# Patient Record
Sex: FEMALE | Race: WHITE | NOT HISPANIC OR LATINO | Employment: FULL TIME | URBAN - METROPOLITAN AREA
[De-identification: names, ages, dates, MRNs, and addresses within clinical notes are randomized per-mention and may not be internally consistent; named-entity substitution may affect disease eponyms.]

---

## 2023-05-13 ASSESSMENT — PROMIS GLOBAL HEALTH SCALE
RATE_GENERAL_HEALTH: VERY GOOD
RATE_SOCIAL_SATISFACTION: VERY GOOD
EMOTIONAL_PROBLEMS: SOMETIMES
RATE_PHYSICAL_HEALTH: VERY GOOD
RATE_AVERAGE_FATIGUE: MILD
RATE_AVERAGE_PAIN: 3
RATE_MENTAL_HEALTH: VERY GOOD
RATE_QUALITY_OF_LIFE: VERY GOOD
CARRYOUT_SOCIAL_ACTIVITIES: EXCELLENT
CARRYOUT_PHYSICAL_ACTIVITIES: COMPLETELY

## 2023-05-15 ENCOUNTER — LAB (OUTPATIENT)
Dept: LAB | Facility: LAB | Age: 48
End: 2023-05-15
Payer: COMMERCIAL

## 2023-05-15 ENCOUNTER — OFFICE VISIT (OUTPATIENT)
Dept: PRIMARY CARE | Facility: CLINIC | Age: 48
End: 2023-05-15
Payer: COMMERCIAL

## 2023-05-15 VITALS
HEART RATE: 70 BPM | SYSTOLIC BLOOD PRESSURE: 124 MMHG | BODY MASS INDEX: 34.61 KG/M2 | WEIGHT: 221 LBS | DIASTOLIC BLOOD PRESSURE: 80 MMHG

## 2023-05-15 DIAGNOSIS — E55.9 VITAMIN D DEFICIENCY: ICD-10-CM

## 2023-05-15 DIAGNOSIS — Z11.59 NEED FOR HEPATITIS C SCREENING TEST: ICD-10-CM

## 2023-05-15 DIAGNOSIS — R53.83 OTHER FATIGUE: ICD-10-CM

## 2023-05-15 DIAGNOSIS — Z11.4 ENCOUNTER FOR SCREENING FOR HIV: ICD-10-CM

## 2023-05-15 DIAGNOSIS — F41.9 ANXIETY: ICD-10-CM

## 2023-05-15 DIAGNOSIS — Z00.00 ANNUAL PHYSICAL EXAM: ICD-10-CM

## 2023-05-15 DIAGNOSIS — M54.50 CHRONIC BILATERAL LOW BACK PAIN WITHOUT SCIATICA: ICD-10-CM

## 2023-05-15 DIAGNOSIS — Z12.31 ENCOUNTER FOR SCREENING MAMMOGRAM FOR BREAST CANCER: ICD-10-CM

## 2023-05-15 DIAGNOSIS — Z00.00 ANNUAL PHYSICAL EXAM: Primary | ICD-10-CM

## 2023-05-15 DIAGNOSIS — Z78.0 MENOPAUSE: ICD-10-CM

## 2023-05-15 DIAGNOSIS — G89.29 CHRONIC BILATERAL LOW BACK PAIN WITHOUT SCIATICA: ICD-10-CM

## 2023-05-15 DIAGNOSIS — J30.2 CHRONIC SEASONAL ALLERGIC RHINITIS: ICD-10-CM

## 2023-05-15 DIAGNOSIS — E89.0 HYPOTHYROIDISM, POSTABLATIVE: ICD-10-CM

## 2023-05-15 DIAGNOSIS — G43.909 MIGRAINE WITHOUT STATUS MIGRAINOSUS, NOT INTRACTABLE, UNSPECIFIED MIGRAINE TYPE: ICD-10-CM

## 2023-05-15 PROBLEM — R73.9 HYPERGLYCEMIA: Status: ACTIVE | Noted: 2023-05-15

## 2023-05-15 PROBLEM — E78.2 MIXED HYPERLIPIDEMIA: Status: ACTIVE | Noted: 2023-05-15

## 2023-05-15 PROBLEM — M17.0 DEGENERATIVE ARTHRITIS OF KNEE, BILATERAL: Status: ACTIVE | Noted: 2023-05-15

## 2023-05-15 PROBLEM — R23.2 HOT FLASHES: Status: ACTIVE | Noted: 2023-05-15

## 2023-05-15 PROBLEM — R92.8 ABNORMAL SCREENING MAMMOGRAM: Status: ACTIVE | Noted: 2023-05-15

## 2023-05-15 PROBLEM — Z86.39 HISTORY OF GRAVES' DISEASE: Status: ACTIVE | Noted: 2019-11-25

## 2023-05-15 PROBLEM — F51.05 INSOMNIA SECONDARY TO ANXIETY: Status: ACTIVE | Noted: 2023-05-15

## 2023-05-15 PROBLEM — F40.248 SITUATIONAL PHOBIA: Status: ACTIVE | Noted: 2023-05-15

## 2023-05-15 PROBLEM — L90.0 LICHEN SCLEROSUS: Status: ACTIVE | Noted: 2023-05-15

## 2023-05-15 PROBLEM — R42 VERTIGO, INTERMITTENT: Status: ACTIVE | Noted: 2023-05-15

## 2023-05-15 LAB
ALANINE AMINOTRANSFERASE (SGPT) (U/L) IN SER/PLAS: 13 U/L (ref 7–45)
ALBUMIN (G/DL) IN SER/PLAS: 4.8 G/DL (ref 3.4–5)
ALKALINE PHOSPHATASE (U/L) IN SER/PLAS: 67 U/L (ref 33–110)
ANION GAP IN SER/PLAS: 13 MMOL/L (ref 10–20)
ASPARTATE AMINOTRANSFERASE (SGOT) (U/L) IN SER/PLAS: 14 U/L (ref 9–39)
BASOPHILS (10*3/UL) IN BLOOD BY AUTOMATED COUNT: 0.04 X10E9/L (ref 0–0.1)
BASOPHILS/100 LEUKOCYTES IN BLOOD BY AUTOMATED COUNT: 0.6 % (ref 0–2)
BILIRUBIN TOTAL (MG/DL) IN SER/PLAS: 0.7 MG/DL (ref 0–1.2)
CALCIDIOL (25 OH VITAMIN D3) (NG/ML) IN SER/PLAS: 51 NG/ML
CALCIUM (MG/DL) IN SER/PLAS: 9.8 MG/DL (ref 8.6–10.6)
CARBON DIOXIDE, TOTAL (MMOL/L) IN SER/PLAS: 28 MMOL/L (ref 21–32)
CHLORIDE (MMOL/L) IN SER/PLAS: 105 MMOL/L (ref 98–107)
CHOLESTEROL (MG/DL) IN SER/PLAS: 252 MG/DL (ref 0–199)
CHOLESTEROL IN HDL (MG/DL) IN SER/PLAS: 45.7 MG/DL
CHOLESTEROL/HDL RATIO: 5.5
COBALAMIN (VITAMIN B12) (PG/ML) IN SER/PLAS: 415 PG/ML (ref 211–911)
CREATININE (MG/DL) IN SER/PLAS: 0.77 MG/DL (ref 0.5–1.05)
EOSINOPHILS (10*3/UL) IN BLOOD BY AUTOMATED COUNT: 0.05 X10E9/L (ref 0–0.7)
EOSINOPHILS/100 LEUKOCYTES IN BLOOD BY AUTOMATED COUNT: 0.8 % (ref 0–6)
ERYTHROCYTE DISTRIBUTION WIDTH (RATIO) BY AUTOMATED COUNT: 12.1 % (ref 11.5–14.5)
ERYTHROCYTE MEAN CORPUSCULAR HEMOGLOBIN CONCENTRATION (G/DL) BY AUTOMATED: 33.2 G/DL (ref 32–36)
ERYTHROCYTE MEAN CORPUSCULAR VOLUME (FL) BY AUTOMATED COUNT: 90 FL (ref 80–100)
ERYTHROCYTES (10*6/UL) IN BLOOD BY AUTOMATED COUNT: 5.17 X10E12/L (ref 4–5.2)
GFR FEMALE: >90 ML/MIN/1.73M2
GLUCOSE (MG/DL) IN SER/PLAS: 95 MG/DL (ref 74–99)
HEMATOCRIT (%) IN BLOOD BY AUTOMATED COUNT: 46.7 % (ref 36–46)
HEMOGLOBIN (G/DL) IN BLOOD: 15.5 G/DL (ref 12–16)
HEPATITIS C VIRUS AB PRESENCE IN SERUM: NONREACTIVE
HIV 1/ 2 AG/AB SCREEN: NONREACTIVE
IMMATURE GRANULOCYTES/100 LEUKOCYTES IN BLOOD BY AUTOMATED COUNT: 0.3 % (ref 0–0.9)
LDL: 188 MG/DL (ref 0–99)
LEUKOCYTES (10*3/UL) IN BLOOD BY AUTOMATED COUNT: 6.6 X10E9/L (ref 4.4–11.3)
LYMPHOCYTES (10*3/UL) IN BLOOD BY AUTOMATED COUNT: 2.02 X10E9/L (ref 1.2–4.8)
LYMPHOCYTES/100 LEUKOCYTES IN BLOOD BY AUTOMATED COUNT: 30.4 % (ref 13–44)
MONOCYTES (10*3/UL) IN BLOOD BY AUTOMATED COUNT: 0.31 X10E9/L (ref 0.1–1)
MONOCYTES/100 LEUKOCYTES IN BLOOD BY AUTOMATED COUNT: 4.7 % (ref 2–10)
NEUTROPHILS (10*3/UL) IN BLOOD BY AUTOMATED COUNT: 4.2 X10E9/L (ref 1.2–7.7)
NEUTROPHILS/100 LEUKOCYTES IN BLOOD BY AUTOMATED COUNT: 63.2 % (ref 40–80)
NRBC (PER 100 WBCS) BY AUTOMATED COUNT: 0 /100 WBC (ref 0–0)
PLATELETS (10*3/UL) IN BLOOD AUTOMATED COUNT: 360 X10E9/L (ref 150–450)
POC APPEARANCE, URINE: CLEAR
POC BILIRUBIN, URINE: NEGATIVE
POC BLOOD, URINE: NEGATIVE
POC COLOR, URINE: YELLOW
POC GLUCOSE, URINE: NEGATIVE MG/DL
POC KETONES, URINE: NEGATIVE MG/DL
POC LEUKOCYTES, URINE: NEGATIVE
POC NITRITE,URINE: NEGATIVE
POC PH, URINE: 6 PH
POC PROTEIN, URINE: NEGATIVE MG/DL
POC SPECIFIC GRAVITY, URINE: 1.01
POC UROBILINOGEN, URINE: 0.2 EU/DL
POTASSIUM (MMOL/L) IN SER/PLAS: 4.6 MMOL/L (ref 3.5–5.3)
PROTEIN TOTAL: 6.8 G/DL (ref 6.4–8.2)
SODIUM (MMOL/L) IN SER/PLAS: 141 MMOL/L (ref 136–145)
THYROTROPIN (MIU/L) IN SER/PLAS BY DETECTION LIMIT <= 0.05 MIU/L: 2.44 MIU/L (ref 0.44–3.98)
TRIGLYCERIDE (MG/DL) IN SER/PLAS: 90 MG/DL (ref 0–149)
UREA NITROGEN (MG/DL) IN SER/PLAS: 15 MG/DL (ref 6–23)
VLDL: 18 MG/DL (ref 0–40)

## 2023-05-15 PROCEDURE — 82607 VITAMIN B-12: CPT

## 2023-05-15 PROCEDURE — 87389 HIV-1 AG W/HIV-1&-2 AB AG IA: CPT

## 2023-05-15 PROCEDURE — 82306 VITAMIN D 25 HYDROXY: CPT

## 2023-05-15 PROCEDURE — 86803 HEPATITIS C AB TEST: CPT

## 2023-05-15 PROCEDURE — 85025 COMPLETE CBC W/AUTO DIFF WBC: CPT

## 2023-05-15 PROCEDURE — 1036F TOBACCO NON-USER: CPT | Performed by: FAMILY MEDICINE

## 2023-05-15 PROCEDURE — 99214 OFFICE O/P EST MOD 30 MIN: CPT | Performed by: FAMILY MEDICINE

## 2023-05-15 PROCEDURE — 93000 ELECTROCARDIOGRAM COMPLETE: CPT | Performed by: FAMILY MEDICINE

## 2023-05-15 PROCEDURE — 84443 ASSAY THYROID STIM HORMONE: CPT

## 2023-05-15 PROCEDURE — 80053 COMPREHEN METABOLIC PANEL: CPT

## 2023-05-15 PROCEDURE — 81002 URINALYSIS NONAUTO W/O SCOPE: CPT | Performed by: FAMILY MEDICINE

## 2023-05-15 PROCEDURE — 99396 PREV VISIT EST AGE 40-64: CPT | Performed by: FAMILY MEDICINE

## 2023-05-15 PROCEDURE — 80061 LIPID PANEL: CPT

## 2023-05-15 PROCEDURE — 36415 COLL VENOUS BLD VENIPUNCTURE: CPT

## 2023-05-15 RX ORDER — LEVOCETIRIZINE DIHYDROCHLORIDE 5 MG/1
5 TABLET, FILM COATED ORAL EVERY EVENING
Qty: 90 TABLET | Refills: 3 | Status: SHIPPED | OUTPATIENT
Start: 2023-05-15 | End: 2023-08-28 | Stop reason: SDUPTHER

## 2023-05-15 RX ORDER — ONDANSETRON 4 MG/1
TABLET, FILM COATED ORAL
Qty: 20 TABLET | Refills: 3 | Status: SHIPPED | OUTPATIENT
Start: 2023-05-15 | End: 2024-05-23 | Stop reason: SDUPTHER

## 2023-05-15 RX ORDER — LEVOTHYROXINE SODIUM 125 UG/1
TABLET ORAL
COMMUNITY
End: 2023-05-19 | Stop reason: SDUPTHER

## 2023-05-15 RX ORDER — ONDANSETRON 4 MG/1
TABLET, FILM COATED ORAL
COMMUNITY
End: 2023-05-15 | Stop reason: SDUPTHER

## 2023-05-15 RX ORDER — METHOCARBAMOL 750 MG/1
TABLET, FILM COATED ORAL EVERY 8 HOURS PRN
COMMUNITY
Start: 2022-05-15 | End: 2023-05-15

## 2023-05-15 RX ORDER — EPINEPHRINE 0.3 MG/.3ML
0.3 INJECTION SUBCUTANEOUS
COMMUNITY
Start: 2019-08-02 | End: 2024-05-23 | Stop reason: ALTCHOICE

## 2023-05-15 RX ORDER — HYDROXYZINE HYDROCHLORIDE 50 MG/1
TABLET, FILM COATED ORAL
COMMUNITY
End: 2023-05-15 | Stop reason: SDUPTHER

## 2023-05-15 RX ORDER — RIZATRIPTAN BENZOATE 10 MG/1
TABLET, ORALLY DISINTEGRATING ORAL
COMMUNITY
Start: 2022-06-07 | End: 2023-05-15 | Stop reason: SDUPTHER

## 2023-05-15 RX ORDER — HYDROXYZINE HYDROCHLORIDE 50 MG/1
TABLET, FILM COATED ORAL
Qty: 30 TABLET | Refills: 1 | Status: SHIPPED | OUTPATIENT
Start: 2023-05-15 | End: 2024-05-23 | Stop reason: SDUPTHER

## 2023-05-15 RX ORDER — LEVOCETIRIZINE DIHYDROCHLORIDE 5 MG/1
5 TABLET, FILM COATED ORAL DAILY
COMMUNITY
End: 2023-05-15 | Stop reason: SDUPTHER

## 2023-05-15 RX ORDER — RIZATRIPTAN BENZOATE 10 MG/1
10 TABLET, ORALLY DISINTEGRATING ORAL ONCE AS NEEDED
Qty: 9 TABLET | Refills: 5 | Status: SHIPPED | OUTPATIENT
Start: 2023-05-15 | End: 2024-05-23 | Stop reason: SDUPTHER

## 2023-05-15 RX ORDER — CYCLOBENZAPRINE HCL 5 MG
5 TABLET ORAL 3 TIMES DAILY PRN
COMMUNITY
End: 2023-05-15 | Stop reason: SDUPTHER

## 2023-05-15 RX ORDER — CLOBETASOL PROPIONATE 0.5 MG/G
OINTMENT TOPICAL
COMMUNITY
Start: 2022-05-24 | End: 2023-05-15 | Stop reason: ALTCHOICE

## 2023-05-15 RX ORDER — CYCLOBENZAPRINE HCL 5 MG
5 TABLET ORAL 3 TIMES DAILY PRN
Qty: 30 TABLET | Refills: 0 | Status: SHIPPED | OUTPATIENT
Start: 2023-05-15 | End: 2024-05-23 | Stop reason: SDUPTHER

## 2023-05-15 RX ORDER — DESVENLAFAXINE SUCCINATE 50 MG/1
1 TABLET, EXTENDED RELEASE ORAL DAILY
COMMUNITY
Start: 2022-06-07 | End: 2023-05-15 | Stop reason: SDDI

## 2023-05-15 ASSESSMENT — PATIENT HEALTH QUESTIONNAIRE - PHQ9
2. FEELING DOWN, DEPRESSED OR HOPELESS: NOT AT ALL
1. LITTLE INTEREST OR PLEASURE IN DOING THINGS: NOT AT ALL
SUM OF ALL RESPONSES TO PHQ9 QUESTIONS 1 AND 2: 0

## 2023-05-15 NOTE — ASSESSMENT & PLAN NOTE
We talked about the fact that you are likely in menopause or at least perimenopausal  I do not think it is worth your money checking hormone levels because you are not interested in pursuing hormone replacement therapy, and quite frankly if we did it today and we did it again in 2 weeks those levels would probably be much different  Continue supplementation, as long as you are not creating havoc with your liver functioning because you seem to feel very good while using them

## 2023-05-15 NOTE — ASSESSMENT & PLAN NOTE
Complete history and physical examination was performed  EKG reveals sinus rhythm without acute changes  I have provided you with requisitions for routine screening blood work.  Please have these performed at your earliest convenience.  We will notify you of test results once available and make treatment recommendations accordingly.

## 2023-05-15 NOTE — ASSESSMENT & PLAN NOTE
We will check TSH with your blood work today  We will wait to refill your Synthroid until we have the results of your blood work

## 2023-05-15 NOTE — PROGRESS NOTES
Subjective   Patient ID: Melissa Jorge is a 47 y.o. female who presents for Annual Exam.    HPI  1.  Health maintenance  Yet to have colonoscopy, says she has it scheduled in June  Gets Pap and mammogram through GYN  Not up-to-date on Tdap, says she is very hesitant to get any more vaccines since she had a severe case of vertigo from second COVID-vaccine    2.  Hypothyroidism  Currently taking Synthroid 125 mcg daily  Feels euthyroid    3.  Perimenopausal  Continues to be perimenopausal, having approximately 4-5 periods per year since 2020.  Symptoms have been improved with over-the-counter supplements  She would like vitamin D, B12, and iron studies performed    4.  Migraines  Taking Maxalt and Zofran as needed for migraines with good relief  Her migraines cause her to be very nauseous    5.  Anxiety  Using Atarax as needed  Says she is using it very infrequently and overall feels well    6.  Chronic bilateral low back pain  Requesting refill of Flexeril to be used as needed    Review of Systems  All pertinent positive symptoms are included in the history of present illness.    All other systems have been reviewed and are negative and noncontributory to this patient's current ailments.     Allergies   Allergen Reactions    Vancomycin Angioedema    Levofloxacin Swelling    Morphine Itching    Erythromycin Rash        Immunization History   Administered Date(s) Administered    Influenza, Unspecified 12/09/2009    Pfizer Purple Cap SARS-CoV-2 03/26/2021, 04/16/2021       Objective   Vitals:    05/15/23 0925   BP: 124/80   Pulse: 70   Weight: 100 kg (221 lb)       Physical Exam  CONSTITUTIONAL - well nourished, well developed, looks like stated age, in no acute distress, not ill-appearing, and not tired appearing  SKIN - normal skin color and pigmentation, normal skin turgor without rash, lesions, or nodules visualized  HEAD - no trauma, normocephalic  EYES - pupils are equal and reactive to light, extraocular  muscles are intact, and normal external exam  ENT - TM's intact, no injection, no signs of infection, uvula midline, normal tongue movement and throat normal, no exudate, nasal passage without discharge and patent  NECK - supple without rigidity, no neck mass was observed, no thyromegaly or thyroid nodules  CHEST - clear to auscultation, no wheezing, no crackles and no rales, good effort  CARDIAC - regular rate and regular rhythm, no skipped beats, no murmur  ABDOMEN - no organomegaly, soft, nontender, nondistended, normal bowel sounds, no guarding/rebound/rigidity, negative McBurney sign and negative Snider sign  EXTREMITIES - no edema, no deformities  NEUROLOGICAL - normal gait, normal balance, normal motor, no ataxia; alert, oriented and no focal signs  PSYCHIATRIC - alert, pleasant and cordial, age-appropriate  IMMUNOLOGIC - no cervical lymphadenopathy     Assessment/Plan   Problem List Items Addressed This Visit       Anxiety     Continue Atarax as needed         Relevant Medications    hydrOXYzine HCL (Atarax) 50 mg tablet    Chronic seasonal allergic rhinitis     Continue Xyzal daily         Relevant Medications    levocetirizine (Xyzal) 5 mg tablet    Hypothyroidism, postablative     We will check TSH with your blood work today  We will wait to refill your Synthroid until we have the results of your blood work         Migraine headache     Continue Maxalt and Zofran as needed for migraines         Relevant Medications    rizatriptan MLT (Maxalt-MLT) 10 mg disintegrating tablet    ondansetron (Zofran) 4 mg tablet    Vitamin D deficiency    Relevant Orders    Vitamin D 25-Hydroxy,Total (for eval of Vitamin D levels)    Annual physical exam - Primary     Complete history and physical examination was performed  EKG reveals sinus rhythm without acute changes  I have provided you with requisitions for routine screening blood work.  Please have these performed at your earliest convenience.  We will notify you of  test results once available and make treatment recommendations accordingly.         Relevant Orders    Lipid Panel    TSH with reflex to Free T4 if abnormal    Comprehensive Metabolic Panel    CBC and Auto Differential    HIV 1/2 Antigen/Antibody Screen with Reflex to Confirmation    Hepatitis C Antibody    POCT UA (nonautomated) manually resulted (Completed)    ECG 12 lead (Clinic Performed) (Completed)    Menopause     We talked about the fact that you are likely in menopause or at least perimenopausal  I do not think it is worth your money checking hormone levels because you are not interested in pursuing hormone replacement therapy, and quite frankly if we did it today and we did it again in 2 weeks those levels would probably be much different  Continue supplementation, as long as you are not creating havoc with your liver functioning because you seem to feel very good while using them         Relevant Orders    Vitamin B12    Chronic low back pain     Continue Flexeril as needed         Relevant Medications    cyclobenzaprine (Flexeril) 5 mg tablet     Other Visit Diagnoses       Encounter for screening for HIV        Relevant Orders    HIV 1/2 Antigen/Antibody Screen with Reflex to Confirmation    Need for hepatitis C screening test        Relevant Orders    Hepatitis C Antibody    Other fatigue        Relevant Orders    Vitamin B12    Encounter for screening mammogram for breast cancer        Relevant Orders    BI mammo bilateral screening tomosynthesis

## 2023-05-17 NOTE — RESULT ENCOUNTER NOTE
Cholesterol 252 with  previous 211-1 47    Complete blood cell count relatively normal with hematocrit 0.7 above normal, not too worried about that currently; we will continue to monitor    HIV, hepatitis C both nonreactive    Vitamin D excellent at 51, vitamin B12 excellent at 415 and thyroid is normal as well    Sugar, kidney, liver absolutely fantastic    For the most part your blood work looks good, cholesterol is a bit high with that bad cholesterol at 188, your current heart attack risk over the next 10 years is 2% so generally speaking if it is less than 5% we do not use cholesterol medication, or at least recommended, but once her bad cholesterol gets above 179 and we do recommended so you may want to consider taking a statin therapy, low-dose, in addition to drastically changing lifestyle with a goal of weight loss

## 2023-05-18 DIAGNOSIS — E03.9 HYPOTHYROIDISM, UNSPECIFIED TYPE: ICD-10-CM

## 2023-05-19 DIAGNOSIS — E89.0 HYPOTHYROIDISM, POSTABLATIVE: Primary | ICD-10-CM

## 2023-05-19 RX ORDER — LEVOTHYROXINE SODIUM 125 UG/1
TABLET ORAL
Qty: 90 TABLET | Refills: 1 | Status: SHIPPED | OUTPATIENT
Start: 2023-05-19 | End: 2024-02-12 | Stop reason: SDUPTHER

## 2023-05-19 RX ORDER — LEVOTHYROXINE SODIUM 125 UG/1
TABLET ORAL
Qty: 90 TABLET | Refills: 1 | OUTPATIENT
Start: 2023-05-19

## 2023-08-28 DIAGNOSIS — J30.2 CHRONIC SEASONAL ALLERGIC RHINITIS: ICD-10-CM

## 2023-08-28 RX ORDER — LEVOCETIRIZINE DIHYDROCHLORIDE 5 MG/1
5 TABLET, FILM COATED ORAL EVERY EVENING
Qty: 90 TABLET | Refills: 3 | Status: SHIPPED
Start: 2023-08-28 | End: 2024-05-23 | Stop reason: SDUPTHER

## 2023-12-19 ENCOUNTER — TELEMEDICINE (OUTPATIENT)
Dept: PRIMARY CARE | Facility: CLINIC | Age: 48
End: 2023-12-19
Payer: COMMERCIAL

## 2023-12-19 DIAGNOSIS — T36.95XA ANTIBIOTIC-INDUCED YEAST INFECTION: ICD-10-CM

## 2023-12-19 DIAGNOSIS — J01.40 ACUTE NON-RECURRENT PANSINUSITIS: Primary | ICD-10-CM

## 2023-12-19 DIAGNOSIS — B37.9 ANTIBIOTIC-INDUCED YEAST INFECTION: ICD-10-CM

## 2023-12-19 PROCEDURE — 99214 OFFICE O/P EST MOD 30 MIN: CPT | Performed by: FAMILY MEDICINE

## 2023-12-19 RX ORDER — FLUCONAZOLE 150 MG/1
150 TABLET ORAL ONCE
Qty: 1 TABLET | Refills: 0 | Status: SHIPPED | OUTPATIENT
Start: 2023-12-19 | End: 2023-12-19

## 2023-12-19 RX ORDER — DOXYCYCLINE 100 MG/1
100 CAPSULE ORAL 2 TIMES DAILY
Qty: 14 CAPSULE | Refills: 0 | Status: SHIPPED | OUTPATIENT
Start: 2023-12-19 | End: 2023-12-20 | Stop reason: SINTOL

## 2023-12-19 NOTE — PROGRESS NOTES
Subjective   Patient ID: Melissa Jorge is a 48 y.o. female who presents for No chief complaint on file..    Past Medical, Surgical, and Family History reviewed and updated in chart.    Reviewed all medications by prescribing practitioner or clinical pharmacist (such as prescriptions, OTCs, herbal therapies and supplements) and documented in the medical record.    HPI  Melissa's  fell ill approximately two weeks ago, around the same time Melissa began experiencing symptoms of a cold. Despite these symptoms persisting, Melissa has tested negative for COVID-19 twice, most recently just yesterday. She has reported experiencing dental pain, facial pressure and pain, as well as a noticeable discoloration under her eyes. Her symptoms also include a non-productive cough and extreme fatigue. Melissa's daughter, who is a nurse, examined her and found her lungs to be clear. Melissa has been dealing with a significant amount of postnasal drip and ear pressure.    She does not have any recent travel history, but her current work situation involves frequent meetings and handshaking, which could have increased her exposure to potential pathogens. Yesterday, she slept for an unusually long duration of 12 hours and even had to call in sick for work, which is out of character for her. Melissa's lymph nodes are swollen. Melissa has also recently noticed a thick, green nasal discharge.    Review of Systems  All pertinent positive symptoms are included in the history of present illness.    All other systems have been reviewed and are negative and noncontributory to this patient's current ailments.    Past Medical History:   Diagnosis Date    Allergic     Angioneurotic edema, initial encounter 08/02/2019    Angioedema of lips    Anxiety     Migraine, unspecified, not intractable, without status migrainosus 04/07/2022    Migraine headache    Personal history of other endocrine, nutritional and metabolic disease     History of thyroid disorder     Postprocedural hypothyroidism 2022    Hypothyroidism, postablative     Past Surgical History:   Procedure Laterality Date     SECTION, CLASSIC  2015     Section     SECTION, LOW TRANSVERSE      ENDOMETRIAL ABLATION      OOPHORECTOMY  2015    Oophorectomy - Unilateral (Removal Of One Ovary)    OTHER SURGICAL HISTORY  2015    Uterine Surgery    OTHER SURGICAL HISTORY  2020    Endometrial ablation    WISDOM TOOTH EXTRACTION       Social History     Tobacco Use    Smoking status: Never     Passive exposure: Never    Smokeless tobacco: Never   Substance Use Topics    Alcohol use: Not Currently     Comment: None    Drug use: Never     Family History   Problem Relation Name Age of Onset    Arthritis Mother Mother     Depression Mother Mother     Drug abuse Mother Mother     Early natural death Mother Mother     Hearing loss Mother Mother     Miscarriages / Stillbirths Mother Mother     Alcohol abuse Father Father     Heart disease Father Father     Cancer Maternal Grandmother Grandmother     Alcohol abuse Paternal Grandfather Grandfather      Immunization History   Administered Date(s) Administered    Influenza, Unspecified 2009    Pfizer Purple Cap SARS-CoV-2 2021, 2021     Current Outpatient Medications   Medication Instructions    cyclobenzaprine (FLEXERIL) 5 mg, oral, 3 times daily PRN    doxycycline (VIBRAMYCIN) 100 mg, oral, 2 times daily, Take with at least 8 ounces (large glass) of water, do not lie down for 30 minutes after    EPINEPHrine (EPIPEN) 0.3 mg, Daily RT    fluconazole (DIFLUCAN) 150 mg, oral, Once    hydrOXYzine HCL (Atarax) 50 mg tablet TAKE 1 TABLET BY MOUTH TWICE DAILY AS NEEDED FOR PANIC ATTACK    levocetirizine (XYZAL) 5 mg, oral, Every evening    levothyroxine (Synthroid) 125 mcg tablet TAKE 1 TABLET FIVE DAYS AND 1/2 TABLET TWO DAYS PER WEEK    ondansetron (Zofran) 4 mg tablet TAKE 1-2 TABLETS EVERY 8 HOURS AS NEEDED FOR  NAUSEA    rizatriptan MLT (MAXALT-MLT) 10 mg, oral, Once as needed     Allergies   Allergen Reactions    Vancomycin Angioedema    Levofloxacin Swelling    Morphine Itching    Erythromycin Rash       Objective   There were no vitals filed for this visit.  There is no height or weight on file to calculate BMI.    BP Readings from Last 3 Encounters:   06/29/23 130/82   05/15/23 124/80   06/07/22 124/80      Wt Readings from Last 3 Encounters:   06/29/23 101 kg (223 lb)   05/15/23 100 kg (221 lb)   06/07/22 95.3 kg (210 lb)        No visits with results within 1 Month(s) from this visit.   Latest known visit with results is:   Lab on 05/15/2023   Component Date Value    Cholesterol 05/15/2023 252 (H)     HDL 05/15/2023 45.7     Cholesterol/HDL Ratio 05/15/2023 5.5 (A)     LDL 05/15/2023 188 (H)     VLDL 05/15/2023 18     Triglycerides 05/15/2023 90     TSH 05/15/2023 2.44     Glucose 05/15/2023 95     Sodium 05/15/2023 141     Potassium 05/15/2023 4.6     Chloride 05/15/2023 105     Bicarbonate 05/15/2023 28     Anion Gap 05/15/2023 13     Urea Nitrogen 05/15/2023 15     Creatinine 05/15/2023 0.77     GFR Female 05/15/2023 >90     Calcium 05/15/2023 9.8     Albumin 05/15/2023 4.8     Alkaline Phosphatase 05/15/2023 67     Total Protein 05/15/2023 6.8     AST 05/15/2023 14     Total Bilirubin 05/15/2023 0.7     ALT (SGPT) 05/15/2023 13     WBC 05/15/2023 6.6     nRBC 05/15/2023 0.0     RBC 05/15/2023 5.17     Hemoglobin 05/15/2023 15.5     Hematocrit 05/15/2023 46.7 (H)     MCV 05/15/2023 90     MCHC 05/15/2023 33.2     Platelets 05/15/2023 360     RDW 05/15/2023 12.1     Neutrophils % 05/15/2023 63.2     Immature Granulocytes %,* 05/15/2023 0.3     Lymphocytes % 05/15/2023 30.4     Monocytes % 05/15/2023 4.7     Eosinophils % 05/15/2023 0.8     Basophils % 05/15/2023 0.6     Neutrophils Absolute 05/15/2023 4.20     Lymphocytes Absolute 05/15/2023 2.02     Monocytes Absolute 05/15/2023 0.31     Eosinophils Absolute  05/15/2023 0.05     Basophils Absolute 05/15/2023 0.04     HIV 1 and 2 Screen 05/15/2023 NONREACTIVE     Hepatitis C Ab 05/15/2023 NONREACTIVE     Vitamin D, 25-Hydroxy 05/15/2023 51     Vitamin B-12 05/15/2023 415      Physical Exam  CONSTITUTIONAL - well nourished, well developed, looks like stated age, in no acute distress, not ill-appearing, but tired appearing, nasal speech  SKIN - normal skin color and pigmentation  EYES - normal external exam  LUNGS - breathing comfortably, no dyspnea  EXTREMITIES - no deformities noticeable  NEUROLOGICAL - oriented and no focal signs  PSYCHIATRIC - alert, pleasant and cordial, age-appropriate, not anxious or depressed appearing    Assessment/Plan   Problem List Items Addressed This Visit       Acute non-recurrent pansinusitis - Primary     Risks, benefits, and options of treatment(s) were discussed after reviewing all current medication(s) and drug allergy(ies)  I opted for the treatment that we discussed with instructions on the medication use for your underlying medical ailment(s)  I encouraged supportive care such as rest, fluids and Advil/Tylenol as warranted  Return to the clinic in 7-10 days or sooner if symptoms worsen or persist as we will then further evaluate         Relevant Medications    doxycycline (Vibramycin) 100 mg capsule    Antibiotic-induced yeast infection     I understand that you've previously experienced vaginal yeast infections due to antibiotic therapy. To help prevent this, you've requested a Diflucan tablet. I've sent a prescription for Diflucan to your pharmacy. You can use this as needed to manage any potential yeast infections.         Relevant Medications    fluconazole (Diflucan) 150 mg tablet

## 2023-12-19 NOTE — ASSESSMENT & PLAN NOTE
I understand that you've previously experienced vaginal yeast infections due to antibiotic therapy. To help prevent this, you've requested a Diflucan tablet. I've sent a prescription for Diflucan to your pharmacy. You can use this as needed to manage any potential yeast infections.

## 2023-12-20 DIAGNOSIS — J01.40 ACUTE NON-RECURRENT PANSINUSITIS: Primary | ICD-10-CM

## 2023-12-20 RX ORDER — AMOXICILLIN 875 MG/1
875 TABLET, FILM COATED ORAL 2 TIMES DAILY
Qty: 14 TABLET | Refills: 0 | Status: SHIPPED | OUTPATIENT
Start: 2023-12-20 | End: 2023-12-27

## 2024-02-12 DIAGNOSIS — E89.0 HYPOTHYROIDISM, POSTABLATIVE: ICD-10-CM

## 2024-02-12 RX ORDER — LEVOTHYROXINE SODIUM 125 UG/1
TABLET ORAL
Qty: 90 TABLET | Refills: 0 | Status: SHIPPED
Start: 2024-02-12 | End: 2024-05-27 | Stop reason: SDUPTHER

## 2024-02-13 ENCOUNTER — TELEPHONE (OUTPATIENT)
Dept: OBSTETRICS AND GYNECOLOGY | Facility: CLINIC | Age: 49
End: 2024-02-13
Payer: COMMERCIAL

## 2024-02-13 ENCOUNTER — LAB (OUTPATIENT)
Dept: LAB | Facility: LAB | Age: 49
End: 2024-02-13
Payer: COMMERCIAL

## 2024-02-13 DIAGNOSIS — R30.0 DYSURIA: ICD-10-CM

## 2024-02-13 DIAGNOSIS — N30.00 ACUTE CYSTITIS WITHOUT HEMATURIA: Primary | ICD-10-CM

## 2024-02-13 PROCEDURE — 87086 URINE CULTURE/COLONY COUNT: CPT

## 2024-02-13 RX ORDER — NITROFURANTOIN 25; 75 MG/1; MG/1
100 CAPSULE ORAL 2 TIMES DAILY
Qty: 14 CAPSULE | Refills: 0 | Status: SHIPPED | OUTPATIENT
Start: 2024-02-13 | End: 2024-02-20

## 2024-02-13 NOTE — TELEPHONE ENCOUNTER
Patient called stating she's had an UTI started on Sunday. There is redness, itching and very uncomfortable. She would like to know if this is something she needs to be seen for or can you call in a prescription?    Patient is in meeting all day today and tmrw it will unlikely she will be answering her phone. She if you were to call her leave a detailed message she will call back when she's out of her meeting. To contact her [phone].    Pharmacy: [facility]

## 2024-02-13 NOTE — TELEPHONE ENCOUNTER
Spoke with patient, started having painful urination, frequency, and urgency on Sunday that improved some with increased water intake.  Today the symptoms have returned.  She is willing to go to outpatient lab after her meetings today to provide urine specimen.  Can something be prescribed for her to begin after?  She has done well with macrobid in the past and would like to use [facility] in [place].   Order for urine culture placed.

## 2024-02-14 LAB — BACTERIA UR CULT: NORMAL

## 2024-02-19 ENCOUNTER — APPOINTMENT (OUTPATIENT)
Dept: PRIMARY CARE | Facility: CLINIC | Age: 49
End: 2024-02-19
Payer: COMMERCIAL

## 2024-05-09 ENCOUNTER — APPOINTMENT (OUTPATIENT)
Dept: PRIMARY CARE | Facility: CLINIC | Age: 49
End: 2024-05-09
Payer: COMMERCIAL

## 2024-05-21 ENCOUNTER — APPOINTMENT (OUTPATIENT)
Dept: PRIMARY CARE | Facility: CLINIC | Age: 49
End: 2024-05-21
Payer: COMMERCIAL

## 2024-05-21 ASSESSMENT — PROMIS GLOBAL HEALTH SCALE
RATE_AVERAGE_PAIN: 2
EMOTIONAL_PROBLEMS: OFTEN
RATE_MENTAL_HEALTH: VERY GOOD
RATE_GENERAL_HEALTH: GOOD
RATE_QUALITY_OF_LIFE: VERY GOOD
RATE_PHYSICAL_HEALTH: GOOD
CARRYOUT_SOCIAL_ACTIVITIES: EXCELLENT
CARRYOUT_PHYSICAL_ACTIVITIES: COMPLETELY
RATE_AVERAGE_FATIGUE: MILD
RATE_SOCIAL_SATISFACTION: VERY GOOD

## 2024-05-23 ENCOUNTER — LAB (OUTPATIENT)
Dept: LAB | Facility: LAB | Age: 49
End: 2024-05-23
Payer: COMMERCIAL

## 2024-05-23 ENCOUNTER — OFFICE VISIT (OUTPATIENT)
Dept: PRIMARY CARE | Facility: CLINIC | Age: 49
End: 2024-05-23
Payer: COMMERCIAL

## 2024-05-23 VITALS
HEIGHT: 66 IN | SYSTOLIC BLOOD PRESSURE: 132 MMHG | DIASTOLIC BLOOD PRESSURE: 90 MMHG | WEIGHT: 226 LBS | HEART RATE: 81 BPM | BODY MASS INDEX: 36.32 KG/M2

## 2024-05-23 DIAGNOSIS — R63.8 UNABLE TO LOSE WEIGHT: ICD-10-CM

## 2024-05-23 DIAGNOSIS — Z12.31 ENCOUNTER FOR SCREENING MAMMOGRAM FOR BREAST CANCER: ICD-10-CM

## 2024-05-23 DIAGNOSIS — Z00.00 ANNUAL PHYSICAL EXAM: Primary | ICD-10-CM

## 2024-05-23 DIAGNOSIS — N95.1 PERIMENOPAUSE: ICD-10-CM

## 2024-05-23 DIAGNOSIS — G43.909 MIGRAINE WITHOUT STATUS MIGRAINOSUS, NOT INTRACTABLE, UNSPECIFIED MIGRAINE TYPE: ICD-10-CM

## 2024-05-23 DIAGNOSIS — Z00.00 ANNUAL PHYSICAL EXAM: ICD-10-CM

## 2024-05-23 DIAGNOSIS — J30.2 CHRONIC SEASONAL ALLERGIC RHINITIS: ICD-10-CM

## 2024-05-23 DIAGNOSIS — E89.0 HYPOTHYROIDISM, POSTABLATIVE: ICD-10-CM

## 2024-05-23 DIAGNOSIS — E78.2 MIXED HYPERLIPIDEMIA: ICD-10-CM

## 2024-05-23 DIAGNOSIS — R94.31 ABNORMAL EKG: ICD-10-CM

## 2024-05-23 DIAGNOSIS — M54.50 CHRONIC BILATERAL LOW BACK PAIN WITHOUT SCIATICA: ICD-10-CM

## 2024-05-23 DIAGNOSIS — G89.29 CHRONIC BILATERAL LOW BACK PAIN WITHOUT SCIATICA: ICD-10-CM

## 2024-05-23 DIAGNOSIS — F41.9 ANXIETY: ICD-10-CM

## 2024-05-23 PROBLEM — J01.40 ACUTE NON-RECURRENT PANSINUSITIS: Status: RESOLVED | Noted: 2023-12-19 | Resolved: 2024-05-23

## 2024-05-23 PROBLEM — F40.248 SITUATIONAL PHOBIA: Status: RESOLVED | Noted: 2023-05-15 | Resolved: 2024-05-23

## 2024-05-23 PROBLEM — B37.9 ANTIBIOTIC-INDUCED YEAST INFECTION: Status: RESOLVED | Noted: 2023-12-19 | Resolved: 2024-05-23

## 2024-05-23 PROBLEM — T36.95XA ANTIBIOTIC-INDUCED YEAST INFECTION: Status: RESOLVED | Noted: 2023-12-19 | Resolved: 2024-05-23

## 2024-05-23 LAB
25(OH)D3 SERPL-MCNC: 37 NG/ML (ref 30–100)
ALBUMIN SERPL BCP-MCNC: 4.5 G/DL (ref 3.4–5)
ALP SERPL-CCNC: 60 U/L (ref 33–110)
ALT SERPL W P-5'-P-CCNC: 14 U/L (ref 7–45)
ANION GAP SERPL CALC-SCNC: 8 MMOL/L (ref 10–20)
AST SERPL W P-5'-P-CCNC: 15 U/L (ref 9–39)
BASOPHILS # BLD AUTO: 0.05 X10*3/UL (ref 0–0.1)
BASOPHILS NFR BLD AUTO: 0.7 %
BILIRUB SERPL-MCNC: 0.6 MG/DL (ref 0–1.2)
BUN SERPL-MCNC: 11 MG/DL (ref 6–23)
CALCIUM SERPL-MCNC: 9.2 MG/DL (ref 8.6–10.3)
CHLORIDE SERPL-SCNC: 105 MMOL/L (ref 98–107)
CHOLEST SERPL-MCNC: 235 MG/DL (ref 0–199)
CHOLESTEROL/HDL RATIO: 5.1
CO2 SERPL-SCNC: 29 MMOL/L (ref 21–32)
CREAT SERPL-MCNC: 0.69 MG/DL (ref 0.5–1.05)
EGFRCR SERPLBLD CKD-EPI 2021: >90 ML/MIN/1.73M*2
EOSINOPHIL # BLD AUTO: 0.08 X10*3/UL (ref 0–0.7)
EOSINOPHIL NFR BLD AUTO: 1.1 %
ERYTHROCYTE [DISTWIDTH] IN BLOOD BY AUTOMATED COUNT: 11.8 % (ref 11.5–14.5)
ESTRADIOL SERPL-MCNC: 24 PG/ML
FSH SERPL-ACNC: 104 IU/L
GLUCOSE SERPL-MCNC: 91 MG/DL (ref 74–99)
HCT VFR BLD AUTO: 45.9 % (ref 36–46)
HDLC SERPL-MCNC: 46 MG/DL
HGB BLD-MCNC: 15.2 G/DL (ref 12–16)
IMM GRANULOCYTES # BLD AUTO: 0.02 X10*3/UL (ref 0–0.7)
IMM GRANULOCYTES NFR BLD AUTO: 0.3 % (ref 0–0.9)
LDLC SERPL CALC-MCNC: 165 MG/DL
LH SERPL-ACNC: 34.7 IU/L
LYMPHOCYTES # BLD AUTO: 2.27 X10*3/UL (ref 1.2–4.8)
LYMPHOCYTES NFR BLD AUTO: 31.8 %
MCH RBC QN AUTO: 29.9 PG (ref 26–34)
MCHC RBC AUTO-ENTMCNC: 33.1 G/DL (ref 32–36)
MCV RBC AUTO: 90 FL (ref 80–100)
MONOCYTES # BLD AUTO: 0.44 X10*3/UL (ref 0.1–1)
MONOCYTES NFR BLD AUTO: 6.2 %
NEUTROPHILS # BLD AUTO: 4.27 X10*3/UL (ref 1.2–7.7)
NEUTROPHILS NFR BLD AUTO: 59.9 %
NON HDL CHOLESTEROL: 189 MG/DL (ref 0–149)
NRBC BLD-RTO: 0 /100 WBCS (ref 0–0)
PLATELET # BLD AUTO: 293 X10*3/UL (ref 150–450)
POC APPEARANCE, URINE: CLEAR
POC BILIRUBIN, URINE: NEGATIVE
POC BLOOD, URINE: NEGATIVE
POC COLOR, URINE: YELLOW
POC GLUCOSE, URINE: NEGATIVE MG/DL
POC KETONES, URINE: NEGATIVE MG/DL
POC LEUKOCYTES, URINE: NEGATIVE
POC NITRITE,URINE: NEGATIVE
POC PH, URINE: 6 PH
POC PROTEIN, URINE: NEGATIVE MG/DL
POC SPECIFIC GRAVITY, URINE: 1.02
POC UROBILINOGEN, URINE: 0.2 EU/DL
POTASSIUM SERPL-SCNC: 3.9 MMOL/L (ref 3.5–5.3)
PROGEST SERPL-MCNC: <0.3 NG/ML
PROT SERPL-MCNC: 7 G/DL (ref 6.4–8.2)
RBC # BLD AUTO: 5.08 X10*6/UL (ref 4–5.2)
SODIUM SERPL-SCNC: 138 MMOL/L (ref 136–145)
T4 FREE SERPL-MCNC: 1.03 NG/DL (ref 0.61–1.12)
TESTOST SERPL-MCNC: <30 NG/DL (ref 0–70)
TRIGL SERPL-MCNC: 118 MG/DL (ref 0–149)
TSH SERPL-ACNC: 4.47 MIU/L (ref 0.44–3.98)
VLDL: 24 MG/DL (ref 0–40)
WBC # BLD AUTO: 7.1 X10*3/UL (ref 4.4–11.3)

## 2024-05-23 PROCEDURE — 84439 ASSAY OF FREE THYROXINE: CPT

## 2024-05-23 PROCEDURE — 80061 LIPID PANEL: CPT

## 2024-05-23 PROCEDURE — 1036F TOBACCO NON-USER: CPT | Performed by: FAMILY MEDICINE

## 2024-05-23 PROCEDURE — 82670 ASSAY OF TOTAL ESTRADIOL: CPT

## 2024-05-23 PROCEDURE — 84403 ASSAY OF TOTAL TESTOSTERONE: CPT

## 2024-05-23 PROCEDURE — 80053 COMPREHEN METABOLIC PANEL: CPT

## 2024-05-23 PROCEDURE — 81002 URINALYSIS NONAUTO W/O SCOPE: CPT | Performed by: FAMILY MEDICINE

## 2024-05-23 PROCEDURE — 36415 COLL VENOUS BLD VENIPUNCTURE: CPT

## 2024-05-23 PROCEDURE — 84144 ASSAY OF PROGESTERONE: CPT

## 2024-05-23 PROCEDURE — 99214 OFFICE O/P EST MOD 30 MIN: CPT | Performed by: FAMILY MEDICINE

## 2024-05-23 PROCEDURE — 84443 ASSAY THYROID STIM HORMONE: CPT

## 2024-05-23 PROCEDURE — 82306 VITAMIN D 25 HYDROXY: CPT

## 2024-05-23 PROCEDURE — 93000 ELECTROCARDIOGRAM COMPLETE: CPT | Performed by: FAMILY MEDICINE

## 2024-05-23 PROCEDURE — 99396 PREV VISIT EST AGE 40-64: CPT | Performed by: FAMILY MEDICINE

## 2024-05-23 PROCEDURE — 85025 COMPLETE CBC W/AUTO DIFF WBC: CPT

## 2024-05-23 PROCEDURE — 83002 ASSAY OF GONADOTROPIN (LH): CPT

## 2024-05-23 PROCEDURE — 83001 ASSAY OF GONADOTROPIN (FSH): CPT

## 2024-05-23 RX ORDER — RIZATRIPTAN BENZOATE 10 MG/1
10 TABLET, ORALLY DISINTEGRATING ORAL ONCE AS NEEDED
Qty: 27 TABLET | Refills: 3 | Status: SHIPPED | OUTPATIENT
Start: 2024-05-23 | End: 2025-05-23

## 2024-05-23 RX ORDER — CHOLECALCIFEROL (VITAMIN D3) 25 MCG
3000 TABLET ORAL DAILY
COMMUNITY

## 2024-05-23 RX ORDER — FLUTICASONE PROPIONATE 50 MCG
1 SPRAY, SUSPENSION (ML) NASAL DAILY
Qty: 48 G | Refills: 3 | Status: SHIPPED | OUTPATIENT
Start: 2024-05-23 | End: 2025-05-23

## 2024-05-23 RX ORDER — HYDROXYZINE HYDROCHLORIDE 50 MG/1
TABLET, FILM COATED ORAL
Qty: 30 TABLET | Refills: 1 | Status: SHIPPED | OUTPATIENT
Start: 2024-05-23

## 2024-05-23 RX ORDER — ONDANSETRON 4 MG/1
TABLET, FILM COATED ORAL
Qty: 20 TABLET | Refills: 3 | Status: SHIPPED | OUTPATIENT
Start: 2024-05-23

## 2024-05-23 RX ORDER — LEVOCETIRIZINE DIHYDROCHLORIDE 5 MG/1
5 TABLET, FILM COATED ORAL EVERY EVENING
Qty: 90 TABLET | Refills: 3 | Status: SHIPPED | OUTPATIENT
Start: 2024-05-23 | End: 2025-05-23

## 2024-05-23 RX ORDER — CYCLOBENZAPRINE HCL 5 MG
5 TABLET ORAL 3 TIMES DAILY PRN
Qty: 90 TABLET | Refills: 0 | Status: SHIPPED | OUTPATIENT
Start: 2024-05-23

## 2024-05-23 ASSESSMENT — PATIENT HEALTH QUESTIONNAIRE - PHQ9
SUM OF ALL RESPONSES TO PHQ9 QUESTIONS 1 AND 2: 0
1. LITTLE INTEREST OR PLEASURE IN DOING THINGS: NOT AT ALL
2. FEELING DOWN, DEPRESSED OR HOPELESS: NOT AT ALL

## 2024-05-23 NOTE — PROGRESS NOTES
Subjective   Patient ID: Melissa Jorge is a 48 y.o. female who presents for Annual Exam.    Past Medical, Surgical, and Family History reviewed and updated in chart.    Reviewed all medications by prescribing practitioner or clinical pharmacist (such as prescriptions, OTCs, herbal therapies and supplements) and documented in the medical record.    HPI  1.  Physical exam.  Colonoscopy done 2023, 10-year clearance  Gets Pap and mammogram through GYN, last mammogram 2023, normal, has a self-referral mammogram order on the chart already along with an appointment  Not up-to-date on Tdap, says she is very hesitant to get any more vaccines since she had a severe case of vertigo from second COVID-vaccine, consider Shingrix, not willing to do today    2.  Post ablative hypothyroidism.  Currently taking Synthroid 125 mcg d.a.w. daily  Willing to have blood work done today  Would like to have her medication go to the Synthroid pharmacy    3.  Perimenopausal.  Continues to be perimenopausal, has not had a menstrual cycle in 11 months  Using OTC vitamin supplementation including magnesium glycinate, vitamin D, probiotic, and a stress vitamin  Difficulty losing weight, in fact she feels like she is gaining weight  Not necessarily interested in taking a medication for her, telling me that she knows she has to do a little bit better from a dietary standpoint, but needs it more challenging every day    4.  Migraines.  Taking Maxalt and Zofran as needed for migraines with good relief  Has had to use these medications very infrequently, so she thinks hers may have  and is requesting any refill    5.  Anxiety.  Using Atarax as needed  Says she is using it very infrequently and overall feels well    6.  Chronic bilateral low back pain.  Requesting refill of Flexeril to be used as neede    Review of Systems  All pertinent positive symptoms are included in the history of present illness.    All other systems have  been reviewed and are negative and noncontributory to this patient's current ailments.    Past Medical History:   Diagnosis Date    Allergic     Angioneurotic edema, initial encounter 2019    Angioedema of lips    Anxiety     Migraine, unspecified, not intractable, without status migrainosus 2022    Migraine headache    Personal history of other endocrine, nutritional and metabolic disease     History of thyroid disorder    Postprocedural hypothyroidism 2022    Hypothyroidism, postablative     Past Surgical History:   Procedure Laterality Date     SECTION, CLASSIC  2015     Section     SECTION, LOW TRANSVERSE      ENDOMETRIAL ABLATION      OOPHORECTOMY  2015    Oophorectomy - Unilateral (Removal Of One Ovary)    OTHER SURGICAL HISTORY  2015    Uterine Surgery    OTHER SURGICAL HISTORY  2020    Endometrial ablation    WISDOM TOOTH EXTRACTION       Social History     Tobacco Use    Smoking status: Never     Passive exposure: Never    Smokeless tobacco: Never   Substance Use Topics    Alcohol use: Not Currently     Comment: None    Drug use: Never     Family History   Problem Relation Name Age of Onset    Arthritis Mother Mother     Depression Mother Mother     Drug abuse Mother Mother     Early natural death Mother Mother     Hearing loss Mother Mother     Miscarriages / Stillbirths Mother Mother     Alcohol abuse Father Father     Heart disease Father Father     Cancer Maternal Grandmother Grandmother     Alcohol abuse Paternal Grandfather Grandfather      Immunization History   Administered Date(s) Administered    Influenza, Unspecified 2009    Pfizer Purple Cap SARS-CoV-2 2021, 2021     Current Outpatient Medications   Medication Instructions    cholecalciferol (VITAMIN D3) 3,000 Units, oral, Daily    cyclobenzaprine (FLEXERIL) 5 mg, oral, 3 times daily PRN    fluticasone (Flonase) 50 mcg/actuation nasal spray 1 spray, Each Nostril,  "Daily, Shake gently. Before first use, prime pump. After use, clean tip and replace cap.    hydrOXYzine HCL (Atarax) 50 mg tablet TAKE 1 TABLET BY MOUTH TWICE DAILY AS NEEDED FOR PANIC ATTACK    levocetirizine (XYZAL) 5 mg, oral, Every evening    magnesium glycinate 100 mg tablet 2 tablets, oral, Daily    ondansetron (Zofran) 4 mg tablet TAKE 1-2 TABLETS EVERY 8 HOURS AS NEEDED FOR NAUSEA    rizatriptan MLT (MAXALT-MLT) 10 mg, oral, Once as needed    Synthroid 125 mcg tablet TAKE 1 TABLET FIVE DAYS AND 1/2 TABLET TWO DAYS PER WEEK     Allergies   Allergen Reactions    Vancomycin Angioedema    Levofloxacin Swelling    Morphine Itching    Erythromycin Rash       Objective   Vitals:    05/23/24 0933   BP: 132/90   Pulse: 81   Weight: 103 kg (226 lb)   Height: 1.676 m (5' 6\")     Body mass index is 36.48 kg/m².    BP Readings from Last 3 Encounters:   05/23/24 132/90   06/29/23 130/82   05/15/23 124/80      Wt Readings from Last 3 Encounters:   05/23/24 103 kg (226 lb)   06/29/23 101 kg (223 lb)   05/15/23 100 kg (221 lb)        Office Visit on 05/23/2024   Component Date Value    POC Color, Urine 05/23/2024 Yellow     POC Appearance, Urine 05/23/2024 Clear     POC Glucose, Urine 05/23/2024 NEGATIVE     POC Bilirubin, Urine 05/23/2024 NEGATIVE     POC Ketones, Urine 05/23/2024 NEGATIVE     POC Specific Gravity, Ur* 05/23/2024 1.020     POC Blood, Urine 05/23/2024 NEGATIVE     POC PH, Urine 05/23/2024 6.0     POC Protein, Urine 05/23/2024 NEGATIVE     POC Urobilinogen, Urine 05/23/2024 0.2     Poc Nitrite, Urine 05/23/2024 NEGATIVE     POC Leukocytes, Urine 05/23/2024 NEGATIVE      Physical Exam  CONSTITUTIONAL - well nourished, well developed, looks like stated age, in no acute distress, not ill-appearing, and not tired appearing  SKIN - normal skin color and pigmentation, normal skin turgor without rash, lesions, or nodules visualized  HEAD - no trauma, normocephalic  EYES - pupils are equal and reactive to light, " extraocular muscles are intact, and normal external exam  ENT - TM's intact, no injection, no signs of infection, uvula midline, normal tongue movement and throat normal, no exudate  NECK - supple without rigidity, no neck mass was observed, no thyromegaly or thyroid nodules  CHEST - clear to auscultation, no wheezing, no crackles and no rales, good effort  CARDIAC - regular rate and regular rhythm, no skipped beats, no murmur  ABDOMEN - no organomegaly, soft, nontender, nondistended, normal bowel sounds, no guarding/rebound/rigidity, negative McBurney sign and negative Snider sign  EXTREMITIES - no obvious or evident edema, no obvious or evident deformities  NEUROLOGICAL - normal gait, normal balance, normal motor, no ataxia, DTRs equal and symmetrical; alert, oriented and no focal signs  PSYCHIATRIC - alert, pleasant and cordial, age-appropriate  IMMUNOLOGIC - no cervical lymphadenopathy    Assessment/Plan   Problem List Items Addressed This Visit       Anxiety    Relevant Medications    hydrOXYzine HCL (Atarax) 50 mg tablet    Chronic seasonal allergic rhinitis     Relatively stable with the use of Xyzal and Flonase, requesting a handwritten prescription which was provided         Relevant Medications    fluticasone (Flonase) 50 mcg/actuation nasal spray    levocetirizine (Xyzal) 5 mg tablet    Hypothyroidism, postablative     We will check TSH with your blood work today  We will wait to refill your Synthroid until we have the results of your blood work         Migraine headache    Relevant Medications    ondansetron (Zofran) 4 mg tablet    rizatriptan MLT (Maxalt-MLT) 10 mg disintegrating tablet    Mixed hyperlipidemia     Current ASCVD risk 2.3%  If LDL goes above 180, we need to consider starting medication         Annual physical exam - Primary     Complete history and physical examination was performed  We will notify of test results once available         Relevant Orders    Lipid Panel    TSH with reflex to  Free T4 if abnormal    Comprehensive Metabolic Panel    CBC and Auto Differential    POCT UA (nonautomated) manually resulted (Completed)    ECG 12 Lead (Completed)    Chronic low back pain    Relevant Medications    cyclobenzaprine (Flexeril) 5 mg tablet    Abnormal EKG     EKG reveals sinus rhythm, left axis anterior fascicular block with possible left atrial enlargement, asymptomatic from a cardiac standpoint  Stress echo recommended, but currently declined  If you change your mind, please let me know so that I get this ordered ASAP         Unable to lose weight     We talked at length about potential etiologies including perimenopause  We will obtain some hormone levels to determine if this is the potential etiology  Continue working on lifestyle modification including decreasing carbohydrates and exercise  We briefly talked about the GLP-1 medication class, however at this time you are not interested in pursuing  If you change your mind, please let me know    GLP-1 (glucagon-like peptide-1) medications, such as GLP-1 receptor agonists, offer several benefits for individuals who take them. Here are some of the potential advantages:    1. Blood sugar control: GLP-1 medications help regulate blood sugar levels by stimulating insulin release from the pancreas and reducing the production of glucagon, a hormone that raises blood sugar. This can lead to improved glycemic control in individuals with hyperglycemia/prediabetes including those with type 2 diabetes.    2. Weight management: GLP-1 medications have been associated with weight loss or weight maintenance. They can promote a feeling of fullness, reduce appetite, and slow down gastric emptying, which may contribute to decreased calorie intake and potential weight loss.    3. Cardiovascular protection: Some GLP-1 medications have demonstrated cardiovascular benefits. They have been shown to reduce the risk of major adverse cardiovascular events (MACE) in  individuals with established cardiovascular disease or high cardiovascular risk.    4. Blood pressure control: GLP-1 medications may have a positive impact on blood pressure. They can lead to modest reductions in systolic and diastolic blood pressure, which is beneficial for individuals with elevated blood pressure, hypertension, or cardiovascular disease.    5. Potential renal benefits: There is evidence suggesting that GLP-1 medications might have renal protective effects. They may reduce albuminuria (presence of protein in urine) and slow the decline of kidney function in individuals, in particular those with diabetic kidney disease.    6. Lower hypoglycemia risk: Compared to some other diabetes medications, GLP-1 medications have a lower risk of causing hypoglycemia (low blood sugar). This can be advantageous, particularly for individuals who are at higher risk of experiencing hypoglycemic episodes.    7. Potential improvement in beta-cell function: GLP-1 medications have been associated with preserving or improving pancreatic beta-cell function, which is responsible for insulin production. This can be beneficial for individuals with prediabetes and type 2 diabetes.         Relevant Orders    Estradiol    Progesterone    FSH & LH    Vitamin D 25-Hydroxy,Total (for eval of Vitamin D levels)    Testosterone    Perimenopause    Relevant Orders    Estradiol    Progesterone    FSH & LH    Vitamin D 25-Hydroxy,Total (for eval of Vitamin D levels)    Testosterone     Other Visit Diagnoses       Encounter for screening mammogram for breast cancer        Appointment has been scheduled with a self-referral mammogram requisition so when I tried to enter it, EMR would not accept it

## 2024-05-23 NOTE — ASSESSMENT & PLAN NOTE
We talked at length about potential etiologies including perimenopause  We will obtain some hormone levels to determine if this is the potential etiology  Continue working on lifestyle modification including decreasing carbohydrates and exercise  We briefly talked about the GLP-1 medication class, however at this time you are not interested in pursuing  If you change your mind, please let me know    GLP-1 (glucagon-like peptide-1) medications, such as GLP-1 receptor agonists, offer several benefits for individuals who take them. Here are some of the potential advantages:    1. Blood sugar control: GLP-1 medications help regulate blood sugar levels by stimulating insulin release from the pancreas and reducing the production of glucagon, a hormone that raises blood sugar. This can lead to improved glycemic control in individuals with hyperglycemia/prediabetes including those with type 2 diabetes.    2. Weight management: GLP-1 medications have been associated with weight loss or weight maintenance. They can promote a feeling of fullness, reduce appetite, and slow down gastric emptying, which may contribute to decreased calorie intake and potential weight loss.    3. Cardiovascular protection: Some GLP-1 medications have demonstrated cardiovascular benefits. They have been shown to reduce the risk of major adverse cardiovascular events (MACE) in individuals with established cardiovascular disease or high cardiovascular risk.    4. Blood pressure control: GLP-1 medications may have a positive impact on blood pressure. They can lead to modest reductions in systolic and diastolic blood pressure, which is beneficial for individuals with elevated blood pressure, hypertension, or cardiovascular disease.    5. Potential renal benefits: There is evidence suggesting that GLP-1 medications might have renal protective effects. They may reduce albuminuria (presence of protein in urine) and slow the decline of kidney function in  individuals, in particular those with diabetic kidney disease.    6. Lower hypoglycemia risk: Compared to some other diabetes medications, GLP-1 medications have a lower risk of causing hypoglycemia (low blood sugar). This can be advantageous, particularly for individuals who are at higher risk of experiencing hypoglycemic episodes.    7. Potential improvement in beta-cell function: GLP-1 medications have been associated with preserving or improving pancreatic beta-cell function, which is responsible for insulin production. This can be beneficial for individuals with prediabetes and type 2 diabetes.

## 2024-05-23 NOTE — ASSESSMENT & PLAN NOTE
EKG reveals sinus rhythm, left axis anterior fascicular block with possible left atrial enlargement, asymptomatic from a cardiac standpoint  Stress echo recommended, but currently declined  If you change your mind, please let me know so that I get this ordered ASAP

## 2024-05-23 NOTE — ASSESSMENT & PLAN NOTE
Relatively stable with the use of Xyzal and Flonase, requesting a handwritten prescription which was provided

## 2024-05-27 DIAGNOSIS — E89.0 HYPOTHYROIDISM, POSTABLATIVE: ICD-10-CM

## 2024-05-27 RX ORDER — LEVOTHYROXINE SODIUM 125 UG/1
TABLET ORAL
Qty: 90 TABLET | Refills: 0 | Status: SHIPPED | OUTPATIENT
Start: 2024-05-27

## 2024-05-27 NOTE — RESULT ENCOUNTER NOTE
1. Thyroid: Your thyroid-stimulating hormone (TSH) level is slightly off at 4.47. Our goal for your TSH level is between 0.1 and 2.00. As such, I recommend we increase your thyroid medication and retest your TSH levels in about 6 to 8 weeks.  I am going to have you take Synthroid 125 mcg, 1 tablet every day of the week.    2. Cholesterol: Your latest readings are as follows: Total cholesterol is 235 (previously 252), High-Density Lipoprotein (HDL) is 46 (previously 45), Low-Density Lipoprotein (LDL) is 165 (previously 188), and Triglycerides are 118 (previously 90).  Your heart attack risk over the next 10 years is calculated to be 2% so at this juncture the use of cholesterol-lowering medication is not indicated.    3. Sugar, Kidney, Liver, Electrolytes, and Complete Blood Cell Count: All results for these tests came back normal, indicating good health in these areas.    4. Hormone Levels: Your estrogen level suggests the follicular phase of your menstrual cycle, and your testosterone level is within the appropriate range. Your progesterone level indicates either post-menopause or the follicular phase, your follicle-stimulating hormone (FSH) level suggests menopause, and your luteinizing hormone (LH) level indicates either the mid-cycle or post-menopause phase. As you know, hormone levels can fluctuate greatly during perimenopause, which can make them difficult to interpret.    5. Vitamin D: Your level is decent at 37. This is within the normal range, but we'll continue to monitor it as maintaining an adequate level is important for bone health.

## 2024-05-28 DIAGNOSIS — E89.0 HYPOTHYROIDISM, POSTABLATIVE: ICD-10-CM

## 2024-05-28 DIAGNOSIS — E78.2 MIXED HYPERLIPIDEMIA: ICD-10-CM

## 2024-05-28 DIAGNOSIS — R94.31 ABNORMAL EKG: ICD-10-CM

## 2024-06-07 ENCOUNTER — APPOINTMENT (OUTPATIENT)
Dept: RADIOLOGY | Facility: CLINIC | Age: 49
End: 2024-06-07
Payer: COMMERCIAL

## 2024-06-07 DIAGNOSIS — Z12.31 SCREENING MAMMOGRAM FOR BREAST CANCER: ICD-10-CM

## 2024-07-02 ENCOUNTER — APPOINTMENT (OUTPATIENT)
Dept: DERMATOLOGY | Facility: CLINIC | Age: 49
End: 2024-07-02
Payer: COMMERCIAL

## 2024-07-10 ENCOUNTER — OFFICE VISIT (OUTPATIENT)
Dept: CARDIOLOGY | Facility: HOSPITAL | Age: 49
End: 2024-07-10
Payer: COMMERCIAL

## 2024-07-10 VITALS — HEART RATE: 91 BPM | HEIGHT: 66 IN | WEIGHT: 229 LBS | BODY MASS INDEX: 36.8 KG/M2

## 2024-07-10 DIAGNOSIS — I25.10 CORONARY ARTERY DISEASE INVOLVING NATIVE CORONARY ARTERY OF NATIVE HEART WITHOUT ANGINA PECTORIS: Primary | ICD-10-CM

## 2024-07-10 DIAGNOSIS — E78.2 MIXED HYPERLIPIDEMIA: ICD-10-CM

## 2024-07-10 DIAGNOSIS — R94.31 ABNORMAL EKG: Primary | ICD-10-CM

## 2024-07-10 DIAGNOSIS — R94.31 ABNORMAL EKG: ICD-10-CM

## 2024-07-10 PROCEDURE — 93005 ELECTROCARDIOGRAM TRACING: CPT | Performed by: INTERNAL MEDICINE

## 2024-07-10 PROCEDURE — 99215 OFFICE O/P EST HI 40 MIN: CPT | Performed by: INTERNAL MEDICINE

## 2024-07-10 PROCEDURE — 99205 OFFICE O/P NEW HI 60 MIN: CPT | Performed by: INTERNAL MEDICINE

## 2024-07-10 PROCEDURE — 93010 ELECTROCARDIOGRAM REPORT: CPT | Performed by: INTERNAL MEDICINE

## 2024-07-10 PROCEDURE — 1036F TOBACCO NON-USER: CPT | Performed by: INTERNAL MEDICINE

## 2024-07-10 RX ORDER — ATORVASTATIN CALCIUM 20 MG/1
40 TABLET, FILM COATED ORAL NIGHTLY
Qty: 180 TABLET | Refills: 3 | Status: SHIPPED | OUTPATIENT
Start: 2024-07-10 | End: 2024-07-18 | Stop reason: SDUPTHER

## 2024-07-11 LAB
ATRIAL RATE: 91 BPM
P AXIS: 33 DEGREES
P OFFSET: 189 MS
P ONSET: 140 MS
PR INTERVAL: 150 MS
Q ONSET: 215 MS
QRS COUNT: 14 BEATS
QRS DURATION: 84 MS
QT INTERVAL: 360 MS
QTC CALCULATION(BAZETT): 442 MS
QTC FREDERICIA: 414 MS
R AXIS: -62 DEGREES
T AXIS: 45 DEGREES
T OFFSET: 395 MS
VENTRICULAR RATE: 91 BPM

## 2024-07-13 PROBLEM — Z01.419 WELL WOMAN EXAM WITH ROUTINE GYNECOLOGICAL EXAM: Status: ACTIVE | Noted: 2024-07-13

## 2024-07-14 NOTE — ASSESSMENT & PLAN NOTE
Pap was sent with HPV.  Mammogram is ordered.  Regular exercise and attaining/maintaining a healthy weight is encouraged.   Adequate calcium intake with diet or supplements is encouraged.    We will notify of any abnormal results.

## 2024-07-14 NOTE — PROGRESS NOTES
Subjective   Patient ID: Melissa Jorge is a 49 y.o. female who presents for Annual Exam.  She was seen in May 2022 by JANIYA Caballero for treatment options for symptomatic menopause. Her symptoms include anxiety, vertigo, hot flushes, night sweats, migraine headaches. She is s/p endometrial ablation and ESSURE tubal occlusion for contraception. She declined systemic hormone use due to concern for VTE and at last visit she was doing well overall. She still feels well overall. Hot flushes and night sweats continue but are tolerable.     She has a history of Lichen Sclerosus and uses clobetasol ointment occasionally.     She is seeing a cardiologist and has cardiac echo scheduled. She started cholesterol medicine and has follow up scheduled for blood pressure as well.     She has had a painful lesion on the left buttock for the past three weeks. This is improving but is .         Review of Systems   Constitutional:  Negative for activity change.   HENT:  Negative for congestion.    Respiratory:  Negative for apnea and cough.    Cardiovascular:  Negative for chest pain.   Gastrointestinal:  Negative for constipation and diarrhea.   Genitourinary:  Negative for hematuria and vaginal pain.   Musculoskeletal:  Negative for joint swelling.   Neurological:  Negative for dizziness.   Psychiatric/Behavioral:  Negative for agitation.        Past Medical History:   Diagnosis Date   • Allergic    • Angioneurotic edema, initial encounter 2019    Angioedema of lips   • Anxiety    • Migraine, unspecified, not intractable, without status migrainosus 2022    Migraine headache   • Personal history of other endocrine, nutritional and metabolic disease     History of thyroid disorder   • Postprocedural hypothyroidism 2022    Hypothyroidism, postablative      Past Surgical History:   Procedure Laterality Date   •  SECTION, CLASSIC  2015     Section   •  SECTION, LOW  TRANSVERSE     • ENDOMETRIAL ABLATION     • OOPHORECTOMY  03/17/2015    Oophorectomy - Unilateral (Removal Of One Ovary)   • OTHER SURGICAL HISTORY  03/17/2015    Uterine Surgery   • OTHER SURGICAL HISTORY  02/18/2020    Endometrial ablation   • WISDOM TOOTH EXTRACTION        Allergies   Allergen Reactions   • Vancomycin Angioedema   • Levofloxacin Swelling   • Morphine Itching   • Erythromycin Rash      Current Outpatient Medications on File Prior to Visit   Medication Sig Dispense Refill   • atorvastatin (Lipitor) 20 mg tablet Take 2 tablets (40 mg) by mouth once daily at bedtime. 180 tablet 3   • cholecalciferol (Vitamin D3) 25 MCG (1000 UT) tablet Take 3 tablets (3,000 Units) by mouth once daily.     • cyclobenzaprine (Flexeril) 5 mg tablet Take 1 tablet (5 mg) by mouth 3 times a day as needed for muscle spasms. 90 tablet 0   • fluticasone (Flonase) 50 mcg/actuation nasal spray Administer 1 spray into each nostril once daily. Shake gently. Before first use, prime pump. After use, clean tip and replace cap. 48 g 3   • hydrOXYzine HCL (Atarax) 50 mg tablet TAKE 1 TABLET BY MOUTH TWICE DAILY AS NEEDED FOR PANIC ATTACK 30 tablet 1   • levocetirizine (Xyzal) 5 mg tablet Take 1 tablet (5 mg) by mouth once daily in the evening. 90 tablet 3   • ondansetron (Zofran) 4 mg tablet TAKE 1-2 TABLETS EVERY 8 HOURS AS NEEDED FOR NAUSEA 20 tablet 3   • rizatriptan MLT (Maxalt-MLT) 10 mg disintegrating tablet Take 1 tablet (10 mg) by mouth 1 time if needed for migraine. 27 tablet 3   • Synthroid 125 mcg tablet TAKE 1 TABLET PO DAILY 90 tablet 0     No current facility-administered medications on file prior to visit.        Objective   Physical Exam  Constitutional:       Appearance: Normal appearance.   Neck:      Thyroid: No thyromegaly.   Cardiovascular:      Rate and Rhythm: Normal rate and regular rhythm.      Heart sounds: Normal heart sounds.   Pulmonary:      Effort: Pulmonary effort is normal.      Breath sounds: Normal  breath sounds.   Chest:      Chest wall: No mass.   Breasts:     Right: Normal. No inverted nipple, mass, nipple discharge or skin change.      Left: Normal. No inverted nipple, mass, nipple discharge or skin change.   Abdominal:      General: There is no distension.      Palpations: Abdomen is soft. There is no mass.      Tenderness: There is no abdominal tenderness.   Genitourinary:     General: Normal vulva.      Exam position: Lithotomy position.      Labia:         Right: No rash.         Left: No rash.       Urethra: No urethral lesion.      Vagina: Normal. No lesions.      Cervix: No friability or lesion.      Uterus: Normal. Not enlarged and not tender.       Adnexa: Right adnexa normal and left adnexa normal.        Right: No mass or tenderness.          Left: No mass or tenderness.        Comments: Purple discoloration and thickening on left buttock with slight fluctuance consistent with furuncle.   Musculoskeletal:         General: No deformity.      Cervical back: Neck supple.   Lymphadenopathy:      Cervical: No cervical adenopathy.   Skin:     General: Skin is warm and dry.      Findings: No rash.   Neurological:      General: No focal deficit present.      Mental Status: She is alert.   Psychiatric:         Mood and Affect: Mood normal.         Behavior: Behavior is cooperative.         Thought Content: Thought content normal.         Problem List Items Addressed This Visit       Well woman exam with routine gynecological exam - Primary    Overview     3/18/2021 pap and HPV returned negative.   She uses clobetasol ointment for occasional flairs of lichen sclerosus.          Current Assessment & Plan     Pap was sent with HPV.  Mammogram is ordered.  Regular exercise and attaining/maintaining a healthy weight is encouraged.   Adequate calcium intake with diet or supplements is encouraged.    We will notify of any abnormal results.          Furuncle of buttock    Overview     Furuncle on left buttock.  Will begin bactrim and apply heat.         Relevant Medications    sulfamethoxazole-trimethoprim (Bactrim DS) 800-160 mg tablet     Other Visit Diagnoses       Encounter for screening mammogram for malignant neoplasm of breast        Relevant Orders    BI mammo bilateral screening tomosynthesis

## 2024-07-15 ENCOUNTER — APPOINTMENT (OUTPATIENT)
Dept: RADIOLOGY | Facility: CLINIC | Age: 49
End: 2024-07-15
Payer: COMMERCIAL

## 2024-07-15 VITALS
HEART RATE: 91 BPM | BODY MASS INDEX: 36.8 KG/M2 | HEIGHT: 66 IN | SYSTOLIC BLOOD PRESSURE: 145 MMHG | WEIGHT: 229 LBS | DIASTOLIC BLOOD PRESSURE: 85 MMHG

## 2024-07-15 NOTE — PROGRESS NOTES
Chief complaint:  I am seeing patient in consultation for Dr. Rodriguez regarding hyperlipidemia and an abnormal EKG.    HPI:  Patient is a pleasant 48-year-old female.  She has some occasional elevated blood pressure readings but nothing consistent requiring therapy.  She was noted to have an elevated cholesterol level in her 20s and was was treated short-term but has not been treated more recently.  She denies any diabetes, smoking history, or family history of premature coronary disease.    Patient has not seen a cardiologist previously and has not had much in the way of previous noninvasive cardiovascular testing.  Patient denies any prior history of MI or stroke.  There is no history of rheumatic fever or history of heart murmur.  She has some occasional palpitations but no sustained palpitations or syncope.  She denies any orthopnea, PND or peripheral edema.  She denies any exertionally mediated chest discomfort or claudication.  She does notice some mild dyspnea now and then.  She has been noted to have some abnormal EKGs on several occasions.  She comes in today for further evaluation.    PMH/PSH:  Past medical history is remarkable for the untreated hyperlipidemia as well as borderline hypertension.  She has also has hypothyroidism in the setting of Graves' disease treated with radioactive iodine.  She also struggles with obesity.  Previous surgeries includes 2 C-sections as well as a right oophorectomy.  She has also had a uterine ablation.  She has also had fallopian tube implants.    Allergies: Erythromycin/Levaquin/morphine/vancomycin  EtOH: Denies use.  Caffeine: Moderate use.    FH/SH:  Patient is a 48-year-old  female.  She lives in [place] and is originally from [place].  She has been  for 28 years.  She has a 30-year-old stepson, a 26-year-old daughter and a 22-year-old son.  She admits to being fairly inactive when it comes to any type of exercise program.  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    Review of systems:  All other systems have been reviewed and are negative for complaint.    Physical exam:  Vital signs:  P-91   BP-145/85 (blood pressure was checked in both arms and was equal bilaterally at 145/85).  General: Alert, pleasant middle-age female in no obvious distress.  HEENT: Normal EOMs without thyromegaly or lymphadenopathy.  Lungs: Clear with good air exchange and no crackles or wheezing.  Cardiac: Normal JVP and carotid upstrokes without bruit.  There is a normal S1 and S2 with a 1/6 systolic murmur.  Abdomen: Nontender with normal bowel sounds and no bruits.  Extremities: No edema.  Skin: No acute rash.  Neuro: Grossly intact.  Vascular: Normal brachial, radial and ulnar pulses bilaterally.  Normal popliteal and distal pulses bilaterally.    EKG: Normal sinus rhythm.  Possible anterior infarct.    Lipid panel: Cholesterol-235, HDL-46, LDL-165, TG-118.    Impression/plan:  Patient is a pleasant 48-year-old female who presents with an abnormal EKG.  Her blood pressure is marginally elevated and her lipid panel certainly is somewhat adverse.    I elected not to start any antihypertensive therapy at this time, but I did encourage her to check her blood pressure periodically to be sure that she does not get readings that are concerning enough to prompt initiation of therapy.    I did review her lipid panel in detail with her.  I did think it was prudent to get her started on atorvastatin at 20 mg daily given the severity of the baseline elevation.  I will also set her up for a coronary artery calcium score just to get a better handle on how aggressive to be with lipid-lowering therapy and also help sort out whether we potentially want to consider an ischemic workup.  We like her to get to be a bit more active with an exercise program.    Lastly her EKG does raise the possibility of possible prior silent anterior MI.  I did tell her my  impression was that this was probably a false positive finding and that she probably did not have a prior silent MI.  I will obtain an echocardiogram just to hopefully confirm my clinical suspicion.    I will review her test results in detail with her by phone when they are available.  I will see her back in 2 months, and we will reassess her lipid panel when she returns at that time.  She knows to call for any intercurrent concerns.    Patient instructions:    Begin atorvastatin at 20 mg daily    Monitor your blood pressure as directed.    Report for your echo and coronary artery calcium scan when scheduled.    Return to clinic in 2 months.    Obtain repeat fasting lipid panel just before your next visit

## 2024-07-17 ENCOUNTER — APPOINTMENT (OUTPATIENT)
Dept: OBSTETRICS AND GYNECOLOGY | Facility: CLINIC | Age: 49
End: 2024-07-17
Payer: COMMERCIAL

## 2024-07-17 VITALS
HEIGHT: 66 IN | BODY MASS INDEX: 37.28 KG/M2 | DIASTOLIC BLOOD PRESSURE: 98 MMHG | WEIGHT: 232 LBS | SYSTOLIC BLOOD PRESSURE: 148 MMHG

## 2024-07-17 DIAGNOSIS — L02.32 FURUNCLE OF BUTTOCK: ICD-10-CM

## 2024-07-17 DIAGNOSIS — Z12.31 ENCOUNTER FOR SCREENING MAMMOGRAM FOR MALIGNANT NEOPLASM OF BREAST: ICD-10-CM

## 2024-07-17 DIAGNOSIS — Z01.419 WELL WOMAN EXAM WITH ROUTINE GYNECOLOGICAL EXAM: Primary | ICD-10-CM

## 2024-07-17 PROCEDURE — 99396 PREV VISIT EST AGE 40-64: CPT | Performed by: OBSTETRICS & GYNECOLOGY

## 2024-07-17 PROCEDURE — 1036F TOBACCO NON-USER: CPT | Performed by: OBSTETRICS & GYNECOLOGY

## 2024-07-17 PROCEDURE — 88175 CYTOPATH C/V AUTO FLUID REDO: CPT

## 2024-07-17 PROCEDURE — 3008F BODY MASS INDEX DOCD: CPT | Performed by: OBSTETRICS & GYNECOLOGY

## 2024-07-17 PROCEDURE — 87624 HPV HI-RISK TYP POOLED RSLT: CPT

## 2024-07-17 RX ORDER — SULFAMETHOXAZOLE AND TRIMETHOPRIM 800; 160 MG/1; MG/1
1 TABLET ORAL 2 TIMES DAILY
Qty: 20 TABLET | Refills: 0 | Status: SHIPPED | OUTPATIENT
Start: 2024-07-17 | End: 2024-07-27

## 2024-07-17 ASSESSMENT — ENCOUNTER SYMPTOMS
AGITATION: 0
APNEA: 0
DIZZINESS: 0
JOINT SWELLING: 0
CONSTIPATION: 0
HEMATURIA: 0
COUGH: 0
ACTIVITY CHANGE: 0
DIARRHEA: 0

## 2024-07-18 DIAGNOSIS — E78.2 MIXED HYPERLIPIDEMIA: Primary | ICD-10-CM

## 2024-07-18 DIAGNOSIS — L90.0 LICHEN SCLEROSUS: Primary | ICD-10-CM

## 2024-07-18 DIAGNOSIS — E78.2 MIXED HYPERLIPIDEMIA: ICD-10-CM

## 2024-07-18 RX ORDER — CLOBETASOL PROPIONATE 0.5 MG/G
OINTMENT TOPICAL 2 TIMES DAILY
Qty: 30 G | Refills: 1 | Status: SHIPPED | OUTPATIENT
Start: 2024-07-18

## 2024-07-18 RX ORDER — ATORVASTATIN CALCIUM 20 MG/1
20 TABLET, FILM COATED ORAL NIGHTLY
Qty: 90 TABLET | Refills: 3 | Status: SHIPPED | OUTPATIENT
Start: 2024-07-18 | End: 2025-07-18

## 2024-07-19 ENCOUNTER — HOSPITAL ENCOUNTER (OUTPATIENT)
Dept: RADIOLOGY | Facility: CLINIC | Age: 49
Discharge: HOME | End: 2024-07-19
Payer: COMMERCIAL

## 2024-07-19 VITALS — WEIGHT: 231.48 LBS | HEIGHT: 66 IN | BODY MASS INDEX: 37.2 KG/M2

## 2024-07-19 DIAGNOSIS — Z12.31 SCREENING MAMMOGRAM FOR BREAST CANCER: ICD-10-CM

## 2024-07-19 PROCEDURE — 77063 BREAST TOMOSYNTHESIS BI: CPT

## 2024-07-22 ENCOUNTER — HOSPITAL ENCOUNTER (OUTPATIENT)
Dept: CARDIOLOGY | Facility: CLINIC | Age: 49
Discharge: HOME | End: 2024-07-22
Payer: COMMERCIAL

## 2024-07-22 DIAGNOSIS — R94.31 ABNORMAL EKG: ICD-10-CM

## 2024-07-22 LAB
AORTIC VALVE PEAK VELOCITY: 1.51 M/S
AV PEAK GRADIENT: 9.1 MMHG
AVA (PEAK VEL): 2.58 CM2
EJECTION FRACTION APICAL 4 CHAMBER: 58
EJECTION FRACTION: 60 %
LEFT ATRIUM VOLUME AREA LENGTH INDEX BSA: 18.1 ML/M2
LEFT VENTRICLE INTERNAL DIMENSION DIASTOLE: 4.91 CM (ref 3.5–6)
LEFT VENTRICULAR OUTFLOW TRACT DIAMETER: 2.08 CM
MITRAL VALVE E/A RATIO: 0.66
RIGHT VENTRICLE FREE WALL PEAK S': 19 CM/S
TRICUSPID ANNULAR PLANE SYSTOLIC EXCURSION: 2 CM

## 2024-07-22 PROCEDURE — 93306 TTE W/DOPPLER COMPLETE: CPT

## 2024-07-22 PROCEDURE — 93306 TTE W/DOPPLER COMPLETE: CPT | Performed by: STUDENT IN AN ORGANIZED HEALTH CARE EDUCATION/TRAINING PROGRAM

## 2024-07-29 LAB
CYTOLOGY CMNT CVX/VAG CYTO-IMP: NORMAL
HPV HR 12 DNA GENITAL QL NAA+PROBE: NEGATIVE
HPV HR GENOTYPES PNL CVX NAA+PROBE: NEGATIVE
HPV16 DNA SPEC QL NAA+PROBE: NEGATIVE
HPV18 DNA SPEC QL NAA+PROBE: NEGATIVE
LAB AP HPV GENOTYPE QUESTION: YES
LAB AP HPV HR: NORMAL
LABORATORY COMMENT REPORT: NORMAL
PATH REPORT.TOTAL CANCER: NORMAL

## 2024-07-31 ENCOUNTER — TELEPHONE (OUTPATIENT)
Dept: OBSTETRICS AND GYNECOLOGY | Facility: CLINIC | Age: 49
End: 2024-07-31
Payer: COMMERCIAL

## 2024-07-31 DIAGNOSIS — B37.9 YEAST INFECTION: ICD-10-CM

## 2024-07-31 DIAGNOSIS — L02.32 FURUNCLE OF BUTTOCK: ICD-10-CM

## 2024-07-31 DIAGNOSIS — F41.9 ANXIETY: ICD-10-CM

## 2024-07-31 RX ORDER — FLUCONAZOLE 150 MG/1
150 TABLET ORAL ONCE
Qty: 1 TABLET | Refills: 1 | Status: SHIPPED | OUTPATIENT
Start: 2024-07-31 | End: 2024-07-31

## 2024-07-31 RX ORDER — SULFAMETHOXAZOLE AND TRIMETHOPRIM 800; 160 MG/1; MG/1
1 TABLET ORAL 2 TIMES DAILY
Qty: 20 TABLET | Refills: 0 | Status: SHIPPED | OUTPATIENT
Start: 2024-07-31 | End: 2024-08-10

## 2024-07-31 RX ORDER — HYDROXYZINE HYDROCHLORIDE 50 MG/1
TABLET, FILM COATED ORAL
Qty: 30 TABLET | Refills: 1 | Status: SHIPPED | OUTPATIENT
Start: 2024-07-31

## 2024-08-11 DIAGNOSIS — F41.9 ANXIETY: ICD-10-CM

## 2024-08-11 RX ORDER — HYDROXYZINE HYDROCHLORIDE 50 MG/1
TABLET, FILM COATED ORAL
Qty: 180 TABLET | Refills: 3 | Status: SHIPPED | OUTPATIENT
Start: 2024-08-11

## 2024-08-15 ENCOUNTER — LAB (OUTPATIENT)
Dept: LAB | Facility: LAB | Age: 49
End: 2024-08-15
Payer: COMMERCIAL

## 2024-08-15 DIAGNOSIS — E78.2 MIXED HYPERLIPIDEMIA: ICD-10-CM

## 2024-08-15 DIAGNOSIS — E89.0 HYPOTHYROIDISM, POSTABLATIVE: ICD-10-CM

## 2024-08-15 LAB
ALT SERPL W P-5'-P-CCNC: 18 U/L (ref 7–45)
AST SERPL W P-5'-P-CCNC: 19 U/L (ref 9–39)
CHOLEST SERPL-MCNC: 143 MG/DL (ref 0–199)
CHOLESTEROL/HDL RATIO: 3.4
HDLC SERPL-MCNC: 42 MG/DL
LDLC SERPL CALC-MCNC: 86 MG/DL
NON HDL CHOLESTEROL: 101 MG/DL (ref 0–149)
TRIGL SERPL-MCNC: 76 MG/DL (ref 0–149)
TSH SERPL-ACNC: 0.66 MIU/L (ref 0.44–3.98)
VLDL: 15 MG/DL (ref 0–40)

## 2024-08-15 PROCEDURE — 80061 LIPID PANEL: CPT

## 2024-08-15 PROCEDURE — 84460 ALANINE AMINO (ALT) (SGPT): CPT

## 2024-08-15 PROCEDURE — 36415 COLL VENOUS BLD VENIPUNCTURE: CPT

## 2024-08-15 PROCEDURE — 84443 ASSAY THYROID STIM HORMONE: CPT

## 2024-08-15 PROCEDURE — 84450 TRANSFERASE (AST) (SGOT): CPT

## 2024-08-15 RX ORDER — LEVOTHYROXINE SODIUM 125 UG/1
TABLET ORAL
Qty: 90 TABLET | Refills: 2 | Status: SHIPPED | OUTPATIENT
Start: 2024-08-15

## 2024-08-15 NOTE — RESULT ENCOUNTER NOTE
You had some blood work done recently for both Dr. Rojo and myself. I'm pleased to inform you that your thyroid function is perfect, with a TSH level of 0.66.     Additionally, Dr. Rojo conducted a cholesterol panel and liver function test, and I took the liberty of evaluating those results as well. Everything looks excellent across the board.    I will send a refill of your medication over to the pharmacy through May 2025 at which time we will do your physical exam again.

## 2024-08-20 ENCOUNTER — OFFICE VISIT (OUTPATIENT)
Dept: CARDIOLOGY | Facility: HOSPITAL | Age: 49
End: 2024-08-20
Payer: COMMERCIAL

## 2024-08-20 VITALS
BODY MASS INDEX: 37.21 KG/M2 | OXYGEN SATURATION: 98 % | WEIGHT: 231.5 LBS | DIASTOLIC BLOOD PRESSURE: 70 MMHG | HEIGHT: 66 IN | SYSTOLIC BLOOD PRESSURE: 134 MMHG | HEART RATE: 99 BPM

## 2024-08-20 DIAGNOSIS — R94.31 ABNORMAL EKG: ICD-10-CM

## 2024-08-20 DIAGNOSIS — E78.2 MIXED HYPERLIPIDEMIA: Primary | ICD-10-CM

## 2024-08-20 PROCEDURE — 3008F BODY MASS INDEX DOCD: CPT | Performed by: NURSE PRACTITIONER

## 2024-08-20 PROCEDURE — 99214 OFFICE O/P EST MOD 30 MIN: CPT | Performed by: NURSE PRACTITIONER

## 2024-08-20 PROCEDURE — 1036F TOBACCO NON-USER: CPT | Performed by: NURSE PRACTITIONER

## 2024-08-20 NOTE — PROGRESS NOTES
Subjective   Melissa Jorge is a 49 y.o. female.    Chief Complaint:  Abnormal ECG and Hyperlipidemia    Mrs. Jorge returns for a routine 2 month follow up. She has been feeling well from a cardiac standpoint. She has tolerated the initiation of atorvastatin. She has been working on her diet and exercise program, and has been feeling really well recently. She denies any recent ER visits or hospitalizations. She offers no new cardiovascular complaints or concerns today. She denies any complaints of chest pain, shortness of breath, lightheadedness, dizziness, palpitations, syncope, orthopnea, paroxysmal nocturnal dyspnea, lower extremity swelling or bleeding concerns.           Review of Systems   All other systems reviewed and are negative.      Objective   Physical Exam  Constitutional:       Appearance: Healthy appearance. In no distress  Pulmonary:      Effort: Pulmonary effort is normal.      Breath sounds: Normal breath sounds.   Cardiovascular:      Normal rate. Regular rhythm. Normal S1. Normal S2.       Murmurs: There is no murmur.      Carotids: right carotid pulse +2, no bruit heard over the right carotid. left carotid pulse +2, no bruit heard over the left carotid.  Edema:     Peripheral edema absent.   Abdominal:      Palpations: Abdomen is soft.   Musculoskeletal:       Cervical back: Normal range of motion.   Skin:     General: Skin is warm and dry. Normal color and pigmentation   Neurological:      Mental Status: Alert and oriented to person, place and time.   Psychiatric:     Mood and Affect: appropriate mood and appropriate affect.       Lab Review:   Lab Results   Component Value Date     05/23/2024    K 3.9 05/23/2024     05/23/2024    CO2 29 05/23/2024    BUN 11 05/23/2024    CREATININE 0.69 05/23/2024    GLUCOSE 91 05/23/2024    CALCIUM 9.2 05/23/2024     Lab Results   Component Value Date    WBC 7.1 05/23/2024    HGB 15.2 05/23/2024    HCT 45.9 05/23/2024    MCV 90 05/23/2024      05/23/2024     Lab Results   Component Value Date    CHOL 143 08/15/2024    TRIG 76 08/15/2024    HDL 42.0 08/15/2024       Assessment/Plan   Mrs. Jorge is a pleasant 49 year old  female with a past medical history significant for Graves disease, hyperlipidemia and abnormal EKG. Echocardiogram 7/2024 showed an EF of 60%, normal RVSP and no significant valvular disease. She presents today for routine follow up stable from a cardiac standpoint. Her VS remain stable. She has also been monitoring her BP at home with readings in the 110-120 systolic range. She seems to be tolerating atorvastatin, and her cholesterol has come under much better control. I will have her continue all medications unchanged. I will call her with the results of her CACS (scheduled for 10/2024) when available. She will follow up with us in clinic in 6 months. She knows to call for any concerns.

## 2024-08-25 DIAGNOSIS — G89.29 CHRONIC BILATERAL LOW BACK PAIN WITHOUT SCIATICA: ICD-10-CM

## 2024-08-25 DIAGNOSIS — M54.50 CHRONIC BILATERAL LOW BACK PAIN WITHOUT SCIATICA: ICD-10-CM

## 2024-08-26 RX ORDER — CYCLOBENZAPRINE HCL 5 MG
5 TABLET ORAL NIGHTLY
Qty: 90 TABLET | Refills: 0 | Status: SHIPPED | OUTPATIENT
Start: 2024-08-26 | End: 2024-11-24

## 2024-09-13 ENCOUNTER — OFFICE VISIT (OUTPATIENT)
Dept: PRIMARY CARE | Facility: CLINIC | Age: 49
End: 2024-09-13
Payer: COMMERCIAL

## 2024-09-13 VITALS
DIASTOLIC BLOOD PRESSURE: 90 MMHG | HEART RATE: 92 BPM | BODY MASS INDEX: 36.32 KG/M2 | HEIGHT: 66 IN | SYSTOLIC BLOOD PRESSURE: 140 MMHG | WEIGHT: 226 LBS

## 2024-09-13 DIAGNOSIS — G89.29 CHRONIC BILATERAL LOW BACK PAIN WITHOUT SCIATICA: Primary | ICD-10-CM

## 2024-09-13 DIAGNOSIS — M54.50 CHRONIC BILATERAL LOW BACK PAIN WITHOUT SCIATICA: Primary | ICD-10-CM

## 2024-09-13 PROCEDURE — 99213 OFFICE O/P EST LOW 20 MIN: CPT | Performed by: STUDENT IN AN ORGANIZED HEALTH CARE EDUCATION/TRAINING PROGRAM

## 2024-09-13 PROCEDURE — 3008F BODY MASS INDEX DOCD: CPT | Performed by: STUDENT IN AN ORGANIZED HEALTH CARE EDUCATION/TRAINING PROGRAM

## 2024-09-13 PROCEDURE — 1036F TOBACCO NON-USER: CPT | Performed by: STUDENT IN AN ORGANIZED HEALTH CARE EDUCATION/TRAINING PROGRAM

## 2024-09-13 RX ORDER — CYCLOBENZAPRINE HCL 5 MG
5 TABLET ORAL NIGHTLY
Qty: 90 TABLET | Refills: 0 | Status: SHIPPED | OUTPATIENT
Start: 2024-09-13 | End: 2024-12-12

## 2024-09-13 NOTE — PROGRESS NOTES
"Subjective   Patient ID: Melissa Jorge is a 49 y.o. female who presents for Sciatica.    HPI   She is 49 year old female who came today to office due to right back and leg pain. She was in the urgent care had corticosteroid for pain relief.   Currently she is complaining of back pain and pain that radiate to the leg associated with numbness and tingling. Pain is severe 8-9 out of 10 especially in the evening. She has tried 3 motrin and tylenol every 6 hours for pain and flexeril.   Bending, twisting make the pain worse. NO trauma or heavy lifting before the pain but she has a history of trauma in 2018 in the sacral/ pelvis area.     Patient report normal BP at home    Patient denied chest pain, abdominal pain, leg swelling, urinary symptoms, fever, chills, n/v/d.    Review of Systems  Are negative   Objective   /90   Pulse 92   Ht 1.676 m (5' 6\")   Wt 103 kg (226 lb)   LMP 06/03/2023   BMI 36.48 kg/m²     Physical Exam  General: Alert and oriented. Appears well-nourished and in no acute distress, overweight  Eyes: PERRLA. EOMI.  Head/neck: Normocephalic. Supple.  Lymphatics: No cervical lymphadenopathy.  Respiratory/Thorax: Clear to auscultation bilaterally. No wheezing.   Cardiovascular: Regular rate and rhythm. No murmurs.  Gastrointestinal: Soft, nontender, nondistended. +BS   Musculoskeletal: spine and hip ROM restricted. Pain with internal rotation of the hip. Normal strength. Unable to walk on the toes, unable to walk on the heels. Lino test is positive for pain in the back. Staight leg test was positive only for pain in the back of the thigh but was not radiated to the leg/foot.   Extremities: Warm and well perfused. No peripheral edema.  Neurological: No gross neurologic deficits.   Psychological: Appropriate mood and affect.   Skin: No visible rashes or lesions.    Assessment/Plan   Diagnoses and all orders for this visit:  Chronic right  low back pain with sciatica  -     Referral to " Physical Therapy; Future  -     cyclobenzaprine (Flexeril) 5 mg tablet; Take 1 tablet (5 mg) by mouth once daily at bedtime.  -use Naproxen otc for pain PRN. Discuss the side effect of the medication   -reviewed the lab done in the 5/2024 were showing normal kidney function    I discussed my plan with my attending Dr. Albaro Almanza. MD  Family medicine resident  PGY3

## 2024-09-19 ENCOUNTER — APPOINTMENT (OUTPATIENT)
Dept: PRIMARY CARE | Facility: CLINIC | Age: 49
End: 2024-09-19
Payer: COMMERCIAL

## 2024-09-20 ENCOUNTER — APPOINTMENT (OUTPATIENT)
Dept: PHYSICAL THERAPY | Facility: CLINIC | Age: 49
End: 2024-09-20
Payer: COMMERCIAL

## 2024-09-24 ENCOUNTER — OFFICE VISIT (OUTPATIENT)
Dept: OBSTETRICS AND GYNECOLOGY | Facility: CLINIC | Age: 49
End: 2024-09-24
Payer: COMMERCIAL

## 2024-09-24 VITALS — SYSTOLIC BLOOD PRESSURE: 148 MMHG | DIASTOLIC BLOOD PRESSURE: 98 MMHG | WEIGHT: 226 LBS | BODY MASS INDEX: 36.48 KG/M2

## 2024-09-24 DIAGNOSIS — L02.32 FURUNCLE OF BUTTOCK: Primary | ICD-10-CM

## 2024-09-24 PROCEDURE — 99214 OFFICE O/P EST MOD 30 MIN: CPT | Performed by: OBSTETRICS & GYNECOLOGY

## 2024-09-24 PROCEDURE — 1036F TOBACCO NON-USER: CPT | Performed by: OBSTETRICS & GYNECOLOGY

## 2024-09-24 RX ORDER — SULFAMETHOXAZOLE AND TRIMETHOPRIM 800; 160 MG/1; MG/1
1 TABLET ORAL 2 TIMES DAILY
Qty: 28 TABLET | Refills: 0 | Status: SHIPPED | OUTPATIENT
Start: 2024-09-24 | End: 2024-10-08

## 2024-09-24 ASSESSMENT — ENCOUNTER SYMPTOMS
APNEA: 0
AGITATION: 0
JOINT SWELLING: 0
HEMATURIA: 0
COUGH: 0
ACTIVITY CHANGE: 0
DIARRHEA: 0
CONSTIPATION: 0
DIZZINESS: 0

## 2024-09-24 NOTE — PROGRESS NOTES
Subjective   Patient ID: Melissa Jorge is a 49 y.o. female who presents for check left buttock (Bleeding lesion ).  She was seen in July for annual exam. At that time she had a boil on the left buttocks. She was prescribed bactrim and she noted this spontaneously drained bloody fluid within the week.  The pain was then resolved. As this healed it stayed discolored an she noted an indent remained with a surrounding ring of firmness. This was without symptoms and has remained.    Yesterday she noted new pinpoint hole with blood coming from it. She is using a hot compress off and on. It is not painful.           Review of Systems   Constitutional:  Negative for activity change.   HENT:  Negative for congestion.    Respiratory:  Negative for apnea and cough.    Cardiovascular:  Negative for chest pain.   Gastrointestinal:  Negative for constipation and diarrhea.   Genitourinary:  Negative for hematuria and vaginal pain.   Musculoskeletal:  Negative for joint swelling.   Neurological:  Negative for dizziness.   Psychiatric/Behavioral:  Negative for agitation.        Past Medical History:   Diagnosis Date    Allergic     Angioneurotic edema, initial encounter 2019    Angioedema of lips    Anxiety     Migraine, unspecified, not intractable, without status migrainosus 2022    Migraine headache    Personal history of other endocrine, nutritional and metabolic disease     History of thyroid disorder    Postprocedural hypothyroidism 2022    Hypothyroidism, postablative      Past Surgical History:   Procedure Laterality Date     SECTION, CLASSIC  2015     Section     SECTION, LOW TRANSVERSE      ENDOMETRIAL ABLATION      OOPHORECTOMY  2015    Oophorectomy - Unilateral (Removal Of One Ovary)    OTHER SURGICAL HISTORY  2015    Uterine Surgery    OTHER SURGICAL HISTORY  2020    Endometrial ablation    WISDOM TOOTH EXTRACTION        Allergies   Allergen Reactions     Vancomycin Angioedema    Levofloxacin Swelling    Morphine Itching    Erythromycin Rash      Current Outpatient Medications on File Prior to Visit   Medication Sig Dispense Refill    atorvastatin (Lipitor) 20 mg tablet Take 1 tablet (20 mg) by mouth once daily at bedtime. 90 tablet 3    cholecalciferol (Vitamin D3) 25 MCG (1000 UT) tablet Take 3 tablets (3,000 Units) by mouth once daily.      clobetasol (Temovate) 0.05 % ointment Apply topically 2 times a day. 30 g 1    cyclobenzaprine (Flexeril) 5 mg tablet Take 1 tablet (5 mg) by mouth once daily at bedtime. 90 tablet 0    fluticasone (Flonase) 50 mcg/actuation nasal spray Administer 1 spray into each nostril once daily. Shake gently. Before first use, prime pump. After use, clean tip and replace cap. 48 g 3    hydrOXYzine HCL (Atarax) 50 mg tablet Take 1-2 tablets by mouth at night as needed for sleep. 180 tablet 3    levocetirizine (Xyzal) 5 mg tablet Take 1 tablet (5 mg) by mouth once daily in the evening. 90 tablet 3    ondansetron (Zofran) 4 mg tablet TAKE 1-2 TABLETS EVERY 8 HOURS AS NEEDED FOR NAUSEA 20 tablet 3    rizatriptan MLT (Maxalt-MLT) 10 mg disintegrating tablet Take 1 tablet (10 mg) by mouth 1 time if needed for migraine. 27 tablet 3    Synthroid 125 mcg tablet TAKE 1 TABLET PO DAILY 90 tablet 2     No current facility-administered medications on file prior to visit.        Objective   Physical Exam  Constitutional:       Appearance: Normal appearance.   Cardiovascular:      Rate and Rhythm: Normal rate and regular rhythm.   Pulmonary:      Effort: Pulmonary effort is normal.      Breath sounds: Normal breath sounds.   Abdominal:      Palpations: Abdomen is soft. There is no mass.      Tenderness: There is no abdominal tenderness.      Hernia: No hernia is present.   Genitourinary:     General: Normal vulva.      Exam position: Lithotomy position.      Labia:         Right: No lesion.         Left: No lesion.       Urethra: No urethral lesion.       Vagina: Normal. No lesions or prolapsed vaginal walls.      Cervix: No lesion.      Uterus: Normal. Not enlarged, not tender and no uterine prolapse.       Adnexa: Right adnexa normal and left adnexa normal.        Right: No mass or tenderness.          Left: No mass or tenderness.            Comments: Thickened area with slight skin discoloration. Central pinpoint opening is noted but no fluid is draining. This appears consistent with deep cyst/furuncle with scar formation.   Skin:     General: Skin is warm and dry.   Neurological:      Mental Status: She is alert.           Problem List Items Addressed This Visit       Furuncle of buttock - Primary    Overview     Furuncle on left buttock 7/17/2024. This drained after heat and antibiotics but thickened area and discoloration has remained.  Suspect cyst with scarred drainage tract.          Current Assessment & Plan     Will restart antibiotics and heat. If this fails to resolve she would like referral to surgeon for excision.          Relevant Medications    sulfamethoxazole-trimethoprim (Bactrim DS) 800-160 mg tablet

## 2024-09-24 NOTE — ASSESSMENT & PLAN NOTE
Will restart antibiotics and heat. If this fails to resolve she would like referral to surgeon for excision.

## 2024-10-11 ENCOUNTER — HOSPITAL ENCOUNTER (OUTPATIENT)
Dept: RADIOLOGY | Facility: CLINIC | Age: 49
Discharge: HOME | End: 2024-10-11
Payer: COMMERCIAL

## 2024-10-11 DIAGNOSIS — I25.10 CORONARY ARTERY DISEASE INVOLVING NATIVE CORONARY ARTERY OF NATIVE HEART WITHOUT ANGINA PECTORIS: ICD-10-CM

## 2024-10-11 PROCEDURE — 75571 CT HRT W/O DYE W/CA TEST: CPT

## 2024-12-05 ENCOUNTER — CLINICAL SUPPORT (OUTPATIENT)
Dept: OBSTETRICS AND GYNECOLOGY | Facility: CLINIC | Age: 49
End: 2024-12-05
Payer: COMMERCIAL

## 2024-12-05 DIAGNOSIS — R30.0 DYSURIA: ICD-10-CM

## 2024-12-05 DIAGNOSIS — R30.0 DYSURIA: Primary | ICD-10-CM

## 2024-12-05 PROCEDURE — 87086 URINE CULTURE/COLONY COUNT: CPT

## 2024-12-05 RX ORDER — NITROFURANTOIN 25; 75 MG/1; MG/1
100 CAPSULE ORAL 2 TIMES DAILY
Qty: 14 CAPSULE | Refills: 0 | Status: SHIPPED | OUTPATIENT
Start: 2024-12-05 | End: 2024-12-12

## 2024-12-07 LAB — BACTERIA UR CULT: NORMAL

## 2024-12-24 ENCOUNTER — OFFICE VISIT (OUTPATIENT)
Dept: URGENT CARE | Age: 49
End: 2024-12-24
Payer: COMMERCIAL

## 2024-12-24 VITALS
DIASTOLIC BLOOD PRESSURE: 91 MMHG | BODY MASS INDEX: 37.61 KG/M2 | OXYGEN SATURATION: 99 % | WEIGHT: 233 LBS | HEART RATE: 98 BPM | SYSTOLIC BLOOD PRESSURE: 158 MMHG

## 2024-12-24 DIAGNOSIS — R05.1 ACUTE COUGH: ICD-10-CM

## 2024-12-24 DIAGNOSIS — J01.90 ACUTE NON-RECURRENT SINUSITIS, UNSPECIFIED LOCATION: Primary | ICD-10-CM

## 2024-12-24 DIAGNOSIS — J02.9 SORETHROAT: ICD-10-CM

## 2024-12-24 LAB
POC RAPID INFLUENZA A: NEGATIVE
POC RAPID INFLUENZA B: NEGATIVE
POC RAPID STREP: NEGATIVE

## 2024-12-24 PROCEDURE — 87804 INFLUENZA ASSAY W/OPTIC: CPT

## 2024-12-24 PROCEDURE — 87880 STREP A ASSAY W/OPTIC: CPT

## 2024-12-24 PROCEDURE — 1036F TOBACCO NON-USER: CPT

## 2024-12-24 PROCEDURE — 99203 OFFICE O/P NEW LOW 30 MIN: CPT

## 2024-12-24 RX ORDER — BENZONATATE 200 MG/1
200 CAPSULE ORAL 3 TIMES DAILY PRN
Qty: 30 CAPSULE | Refills: 0 | Status: SHIPPED | OUTPATIENT
Start: 2024-12-24 | End: 2025-01-03

## 2024-12-24 RX ORDER — AMOXICILLIN AND CLAVULANATE POTASSIUM 875; 125 MG/1; MG/1
1 TABLET, FILM COATED ORAL 2 TIMES DAILY
Qty: 20 TABLET | Refills: 0 | Status: SHIPPED | OUTPATIENT
Start: 2024-12-24 | End: 2025-01-03

## 2024-12-24 NOTE — PROGRESS NOTES
Subjective   History of Present Illness: Melissa Jorge is a 49 y.o. female. They present today with a chief complaint of Sinus Problem (Head pressure, sorethroat, green phlegm, [id number] ).      Past Medical History  Allergies as of 2024 - Reviewed 2024   Allergen Reaction Noted    Vancomycin Angioedema 05/15/2023    Levofloxacin Swelling 09/15/2015    Morphine Itching 05/15/2023    Erythromycin Rash 2014       (Not in a hospital admission)       Past Medical History:   Diagnosis Date    Allergic     Angioneurotic edema, initial encounter 2019    Angioedema of lips    Anxiety     Migraine, unspecified, not intractable, without status migrainosus 2022    Migraine headache    Personal history of other endocrine, nutritional and metabolic disease     History of thyroid disorder    Postprocedural hypothyroidism 2022    Hypothyroidism, postablative       Past Surgical History:   Procedure Laterality Date     SECTION, CLASSIC  2015     Section     SECTION, LOW TRANSVERSE      ENDOMETRIAL ABLATION      OOPHORECTOMY  2015    Oophorectomy - Unilateral (Removal Of One Ovary)    OTHER SURGICAL HISTORY  2015    Uterine Surgery    OTHER SURGICAL HISTORY  2020    Endometrial ablation    WISDOM TOOTH EXTRACTION          reports that she has never smoked. She has never been exposed to tobacco smoke. She has never used smokeless tobacco. She reports that she does not currently use alcohol. She reports that she does not use drugs.    Review of Systems  Review of Systems                               Objective    Vitals:    24 1110   BP: (!) 158/91   Pulse: 98   SpO2: 99%   Weight: 106 kg (233 lb)     Patient's last menstrual period was 2023.    Physical Exam  Vitals reviewed.   HENT:      Head: Normocephalic and atraumatic.      Right Ear: Tympanic membrane and ear canal normal.      Left Ear: Tympanic membrane and ear canal normal.       Nose: Nose normal.      Mouth/Throat:      Mouth: Mucous membranes are moist.      Pharynx: Oropharynx is clear. Uvula midline.   Eyes:      Extraocular Movements: Extraocular movements intact.      Conjunctiva/sclera: Conjunctivae normal.      Pupils: Pupils are equal, round, and reactive to light.   Cardiovascular:      Rate and Rhythm: Normal rate and regular rhythm.   Pulmonary:      Effort: Pulmonary effort is normal.      Breath sounds: Normal breath sounds.   Skin:     General: Skin is warm.   Neurological:      Mental Status: She is alert and oriented to person, place, and time.   Psychiatric:         Mood and Affect: Mood normal.         Behavior: Behavior normal.             Point of Care Test & Imaging Results from this visit  Results for orders placed or performed in visit on 12/24/24   POCT Influenza A/B manually resulted   Result Value Ref Range    POC Rapid Influenza A Negative Negative    POC Rapid Influenza B Negative Negative   POCT rapid strep A manually resulted   Result Value Ref Range    POC Rapid Strep Negative Negative      No results found.    Diagnostic study results (if any) were reviewed by Darya Tripathi PA-C.    Assessment/Plan   Allergies, medications, history, and pertinent labs/EKGs/Imaging reviewed by Darya Tripathi PA-C.     Medical Decision Making  - negative rapid strep and flu tests. Will treat for bacterial sinusitis w/ augmentin and benzonatate.   -         Patient is educated about their diagnoses.     -          Discussed medications benefits and adverse effects.     -          Answered all patient’s questions.     -          Patient will call 911 or go to the nearest ED if worsen symptoms .     -          Patient is agreeable to the plan of care and is deemed stable upon discharge.     -          Follow up with your primary care provider in two days.      Orders and Diagnoses  Diagnoses and all orders for this visit:  Acute non-recurrent sinusitis, unspecified location  -      amoxicillin-pot clavulanate (Augmentin) 875-125 mg tablet; Take 1 tablet by mouth 2 times a day for 10 days.  Sorethroat  -     POCT Influenza A/B manually resulted  -     POCT rapid strep A manually resulted  Acute cough  -     benzonatate (Tessalon) 200 mg capsule; Take 1 capsule (200 mg) by mouth 3 times a day as needed for cough for up to 10 days.      Medical Admin Record      Patient disposition: Home    Electronically signed by Darya Tripathi PA-C  11:30 AM

## 2025-02-18 PROBLEM — N95.1 PERIMENOPAUSE: Status: RESOLVED | Noted: 2024-05-23 | Resolved: 2025-02-18

## 2025-02-18 PROBLEM — F51.05 INSOMNIA SECONDARY TO ANXIETY: Status: RESOLVED | Noted: 2023-05-15 | Resolved: 2025-02-18

## 2025-02-18 PROBLEM — L02.32 FURUNCLE OF BUTTOCK: Status: RESOLVED | Noted: 2024-07-17 | Resolved: 2025-02-18

## 2025-02-18 PROBLEM — R92.8 ABNORMAL SCREENING MAMMOGRAM: Status: RESOLVED | Noted: 2023-05-15 | Resolved: 2025-02-18

## 2025-02-18 PROBLEM — R73.9 HYPERGLYCEMIA: Status: RESOLVED | Noted: 2023-05-15 | Resolved: 2025-02-18

## 2025-02-18 PROBLEM — F41.9 INSOMNIA SECONDARY TO ANXIETY: Status: RESOLVED | Noted: 2023-05-15 | Resolved: 2025-02-18

## 2025-02-18 PROBLEM — Z01.419 WELL WOMAN EXAM WITH ROUTINE GYNECOLOGICAL EXAM: Status: RESOLVED | Noted: 2024-07-13 | Resolved: 2025-02-18

## 2025-02-19 ENCOUNTER — OFFICE VISIT (OUTPATIENT)
Dept: CARDIOLOGY | Facility: CLINIC | Age: 50
End: 2025-02-19
Payer: COMMERCIAL

## 2025-02-19 VITALS
DIASTOLIC BLOOD PRESSURE: 86 MMHG | HEART RATE: 76 BPM | WEIGHT: 231 LBS | HEIGHT: 66 IN | OXYGEN SATURATION: 97 % | SYSTOLIC BLOOD PRESSURE: 141 MMHG | BODY MASS INDEX: 37.12 KG/M2

## 2025-02-19 DIAGNOSIS — R94.31 ABNORMAL EKG: ICD-10-CM

## 2025-02-19 DIAGNOSIS — E78.2 MIXED HYPERLIPIDEMIA: Primary | ICD-10-CM

## 2025-02-19 PROCEDURE — 1036F TOBACCO NON-USER: CPT | Performed by: NURSE PRACTITIONER

## 2025-02-19 PROCEDURE — 99214 OFFICE O/P EST MOD 30 MIN: CPT | Performed by: NURSE PRACTITIONER

## 2025-02-19 PROCEDURE — 3008F BODY MASS INDEX DOCD: CPT | Performed by: NURSE PRACTITIONER

## 2025-02-19 ASSESSMENT — PAIN SCALES - GENERAL: PAINLEVEL_OUTOF10: 0-NO PAIN

## 2025-02-19 NOTE — PROGRESS NOTES
Subjective   Melissa Jorge is a 49 y.o. female.    Chief Complaint:  Hyperlipidemia and Abnormal ECG    Mrs. Jorge returns for a routine 6 month follow up. She has been feeling well from a cardiac standpoint. She has remained compliant with her medications, denying any intolerances. She has started watching her salt intake, and her BP has come down nicely on her home machine. She remains somewhat worried regarding the borderline thoracic aortic aneurysm measuring 4 cm on her CT cardiac score, though I reassured her this is still within a normal range. She is going to start seeing a nutritionist to help with her diet and weight loss. She denies any recent ER visits or hospitalizations. She offers no specific cardiovascular complaints or concerns today. She denies any complaints of chest pain, shortness of breath, lightheadedness, dizziness, palpitations, syncope, orthopnea, paroxysmal nocturnal dyspnea, lower extremity swelling or bleeding concerns.          Review of Systems   All other systems reviewed and are negative.      Objective   Physical Exam  Constitutional:       Appearance: Healthy appearance. In no distress  Pulmonary:      Effort: Pulmonary effort is normal.      Breath sounds: Normal breath sounds.   Cardiovascular:      Normal rate. Regular rhythm. Normal S1. Normal S2.       Murmurs: There is no murmur.      Carotids: right carotid pulse +2, no bruit heard over the right carotid. left carotid pulse +2, no bruit heard over the left carotid.  Edema:     Peripheral edema absent.   Abdominal:      Palpations: Abdomen is soft.   Musculoskeletal:       Cervical back: Normal range of motion.   Skin:     General: Skin is warm and dry. Normal color and pigmentation   Neurological:      Mental Status: Alert and oriented to person, place and time.   Psychiatric:     Mood and Affect: appropriate mood and appropriate affect.       Lab Review:   Lab Results   Component Value Date     05/23/2024    K  3.9 05/23/2024     05/23/2024    CO2 29 05/23/2024    BUN 11 05/23/2024    CREATININE 0.69 05/23/2024    GLUCOSE 91 05/23/2024    CALCIUM 9.2 05/23/2024     Lab Results   Component Value Date    WBC 7.1 05/23/2024    HGB 15.2 05/23/2024    HCT 45.9 05/23/2024    MCV 90 05/23/2024     05/23/2024     Lab Results   Component Value Date    CHOL 143 08/15/2024    TRIG 76 08/15/2024    HDL 42.0 08/15/2024       Assessment/Plan   Mrs. Jorge is a pleasant 49 year old  female with a past medical history significant for Graves disease, hyperlipidemia and abnormal EKG. Echocardiogram 7/2024 showed an EF of 60%, normal RVSP and no significant valvular disease. Total CACS 10/2024 was 0. She presents today for routine follow up stable from a cardiac standpoint. Her BP is mildly elevated, though she monitors her BP at home with much better readings. She remains on appropriate lipid lowering therapy. I will have her continue all medications unchanged. We will not embark on any additional cardiovascular testing at this time. She was encouraged to continue to remain active. She will follow up with us in clinic in one year. She knows to call for any concerns.

## 2025-04-01 DIAGNOSIS — E89.0 HYPOTHYROIDISM, POSTABLATIVE: ICD-10-CM

## 2025-04-01 RX ORDER — LEVOTHYROXINE SODIUM 125 UG/1
TABLET ORAL
Qty: 90 TABLET | Refills: 2 | OUTPATIENT
Start: 2025-04-01

## 2025-05-16 ASSESSMENT — PROMIS GLOBAL HEALTH SCALE
RATE_QUALITY_OF_LIFE: VERY GOOD
RATE_MENTAL_HEALTH: VERY GOOD
RATE_SOCIAL_SATISFACTION: VERY GOOD
RATE_AVERAGE_FATIGUE: MODERATE
RATE_GENERAL_HEALTH: VERY GOOD
EMOTIONAL_PROBLEMS: SOMETIMES
CARRYOUT_PHYSICAL_ACTIVITIES: MOSTLY
RATE_PHYSICAL_HEALTH: VERY GOOD
CARRYOUT_SOCIAL_ACTIVITIES: VERY GOOD
RATE_AVERAGE_PAIN: 2

## 2025-05-20 ENCOUNTER — APPOINTMENT (OUTPATIENT)
Dept: PRIMARY CARE | Facility: CLINIC | Age: 50
End: 2025-05-20
Payer: COMMERCIAL

## 2025-05-22 ENCOUNTER — APPOINTMENT (OUTPATIENT)
Dept: PRIMARY CARE | Facility: CLINIC | Age: 50
End: 2025-05-22
Payer: COMMERCIAL

## 2025-05-22 ENCOUNTER — CLINICAL SUPPORT (OUTPATIENT)
Dept: PRIMARY CARE | Facility: CLINIC | Age: 50
End: 2025-05-22
Payer: COMMERCIAL

## 2025-05-22 VITALS
HEART RATE: 84 BPM | WEIGHT: 237 LBS | BODY MASS INDEX: 38.09 KG/M2 | DIASTOLIC BLOOD PRESSURE: 80 MMHG | HEIGHT: 66 IN | SYSTOLIC BLOOD PRESSURE: 124 MMHG

## 2025-05-22 DIAGNOSIS — Z13.1 DIABETES MELLITUS SCREENING: ICD-10-CM

## 2025-05-22 DIAGNOSIS — E66.812 CLASS 2 OBESITY WITH BODY MASS INDEX (BMI) OF 38.0 TO 38.9 IN ADULT, UNSPECIFIED OBESITY TYPE, UNSPECIFIED WHETHER SERIOUS COMORBIDITY PRESENT: ICD-10-CM

## 2025-05-22 DIAGNOSIS — E78.2 MIXED HYPERLIPIDEMIA: ICD-10-CM

## 2025-05-22 DIAGNOSIS — R63.8 UNABLE TO LOSE WEIGHT: Primary | ICD-10-CM

## 2025-05-22 DIAGNOSIS — Z00.00 ANNUAL PHYSICAL EXAM: Primary | ICD-10-CM

## 2025-05-22 DIAGNOSIS — E66.812 OBESITY, CLASS II, BMI 35-39.9: Primary | ICD-10-CM

## 2025-05-22 DIAGNOSIS — M54.50 CHRONIC BILATERAL LOW BACK PAIN WITHOUT SCIATICA: ICD-10-CM

## 2025-05-22 DIAGNOSIS — F41.9 ANXIETY: ICD-10-CM

## 2025-05-22 DIAGNOSIS — G89.29 CHRONIC BILATERAL LOW BACK PAIN WITHOUT SCIATICA: ICD-10-CM

## 2025-05-22 DIAGNOSIS — G43.909 MIGRAINE WITHOUT STATUS MIGRAINOSUS, NOT INTRACTABLE, UNSPECIFIED MIGRAINE TYPE: ICD-10-CM

## 2025-05-22 DIAGNOSIS — E89.0 HYPOTHYROIDISM, POSTABLATIVE: ICD-10-CM

## 2025-05-22 DIAGNOSIS — E55.9 VITAMIN D DEFICIENCY: ICD-10-CM

## 2025-05-22 DIAGNOSIS — J30.2 CHRONIC SEASONAL ALLERGIC RHINITIS: ICD-10-CM

## 2025-05-22 LAB
POC APPEARANCE, URINE: CLEAR
POC BILIRUBIN, URINE: NEGATIVE
POC BLOOD, URINE: NEGATIVE
POC COLOR, URINE: YELLOW
POC GLUCOSE, URINE: NEGATIVE MG/DL
POC KETONES, URINE: NEGATIVE MG/DL
POC LEUKOCYTES, URINE: NEGATIVE
POC NITRITE,URINE: NEGATIVE
POC PH, URINE: 6.5 PH
POC PROTEIN, URINE: NEGATIVE MG/DL
POC SPECIFIC GRAVITY, URINE: 1.02
POC UROBILINOGEN, URINE: 0.2 EU/DL

## 2025-05-22 RX ORDER — ONDANSETRON 4 MG/1
TABLET, FILM COATED ORAL
Qty: 20 TABLET | Refills: 3 | Status: SHIPPED | OUTPATIENT
Start: 2025-05-22

## 2025-05-22 RX ORDER — RIZATRIPTAN BENZOATE 10 MG/1
10 TABLET, ORALLY DISINTEGRATING ORAL ONCE AS NEEDED
Qty: 27 TABLET | Refills: 3 | Status: SHIPPED | OUTPATIENT
Start: 2025-05-22 | End: 2026-05-22

## 2025-05-22 RX ORDER — FLUTICASONE PROPIONATE 50 MCG
1 SPRAY, SUSPENSION (ML) NASAL DAILY
Qty: 48 G | Refills: 3 | Status: SHIPPED | OUTPATIENT
Start: 2025-05-22 | End: 2026-05-22

## 2025-05-22 RX ORDER — LEVOTHYROXINE SODIUM 125 UG/1
TABLET ORAL
Qty: 90 TABLET | Refills: 1 | Status: CANCELLED | OUTPATIENT
Start: 2025-05-22

## 2025-05-22 RX ORDER — HYDROXYZINE HYDROCHLORIDE 50 MG/1
TABLET, FILM COATED ORAL
Qty: 180 TABLET | Refills: 1 | Status: SHIPPED | OUTPATIENT
Start: 2025-05-22

## 2025-05-22 RX ORDER — ATORVASTATIN CALCIUM 20 MG/1
20 TABLET, FILM COATED ORAL NIGHTLY
Qty: 90 TABLET | Refills: 1 | Status: CANCELLED | OUTPATIENT
Start: 2025-05-22 | End: 2025-11-18

## 2025-05-22 RX ORDER — CYCLOBENZAPRINE HCL 5 MG
5 TABLET ORAL 3 TIMES DAILY PRN
Qty: 90 TABLET | Refills: 0 | Status: SHIPPED | OUTPATIENT
Start: 2025-05-22

## 2025-05-22 RX ORDER — LEVOCETIRIZINE DIHYDROCHLORIDE 5 MG/1
5 TABLET, FILM COATED ORAL EVERY EVENING
Qty: 90 TABLET | Refills: 1 | Status: SHIPPED | OUTPATIENT
Start: 2025-05-22 | End: 2025-11-18

## 2025-05-22 NOTE — PROGRESS NOTES
Clinical Pharmacy Appointment    Patient ID: Melissa Jorge is a 49 y.o. female who presents for Obesity.    Pt is here for First appointment.     Referring Provider: Robinson Rodriguez DO  PCP: Robinson Rodriguez DO   Last visit with PCP: 5/22/25   Next visit with PCP: Not scheduled      Subjective     Medication Reconciliation:  Changed: None  Added: None  Discontinued: None    Drug Interactions  No relevant drug interactions were noted.    Medication System Management  Patient's preferred pharmacy: [facility] ([facility])  Adherence/Organization: No concerns  Affordability/Accessibility: No concerns  Pharmacist on care team: Yes    HPI    OBESITY ASSESSMENT  Current Medications:   None    Previous Medications:  None    Pertinent PMH:   Personal/family history of thyroid cancer: No  PMH of MEN2: No  Personal history of pancreatitis: No  CAD: No  Heart attack: No  Stroke: No  Sleep apnea: No  Hyperlipidemia: Yes  Hypertension: No   PCOS: No  Fatty liver disease: No  Gallstones: No  Pre-diabetes: No    Weight monitoring:   Current weight 237 lbs (current BMI 38.25) - Obesity (class 2): BMI 35 to <40  Initial goal is to lose 20-25 lbs    Lifestyle: Has been gaining ~10 pounds/year ever since menopause.  Diet:  Plans to sign up for a 4 month dietitan commitment for $400/month through [employer] in [place], [place].   Has done Weight Watchers in the past. Might consider a meal plan like [employer] going forward.   Current: Limits portions, carbohydrates/sugar, fats. Cutting back on Diet Pepsi. Rarely drinks alcohol. Eats high-protein.   Exercise: Walking 15 minutes 3x/week and plans to further increase. 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     The 10-year ASCVD risk score (Paula BALES, et al., 2019) is: 1.1%    Values used to calculate the score:      Age: 49 years      Sex: Female      Is Non- : No      Diabetic: No      Tobacco smoker: No      Systolic  "Blood Pressure: 124 mmHg      Is BP treated: No      HDL Cholesterol: 42 mg/dL      Total Cholesterol: 143 mg/dL     Objective   Allergies[1]  Social History     Social History Narrative    Not on file      Medication Review  Current Outpatient Medications   Medication Instructions    atorvastatin (LIPITOR) 20 mg, oral, Nightly    clobetasol (Temovate) 0.05 % ointment Topical, 2 times daily    fluticasone (Flonase) 50 mcg/actuation nasal spray 1 spray, Each Nostril, Daily, Shake gently. Before first use, prime pump. After use, clean tip and replace cap.    hydrOXYzine HCL (Atarax) 50 mg tablet Take 1-2 tablets by mouth at night as needed for sleep.    levocetirizine (XYZAL) 5 mg, oral, Every evening    ondansetron (Zofran) 4 mg tablet TAKE 1-2 TABLETS EVERY 8 HOURS AS NEEDED FOR NAUSEA    rizatriptan MLT (MAXALT-MLT) 10 mg, oral, Once as needed    Synthroid 125 mcg tablet TAKE 1 TABLET PO DAILY      Vitals  BP Readings from Last 3 Encounters:   05/22/25 124/80   02/19/25 141/86   12/24/24 (!) 158/91     Labs  A1C  Lab Results   Component Value Date    HGBA1C 5.3 04/23/2022    HGBA1C 5.4 07/15/2021     BMP  Lab Results   Component Value Date    CALCIUM 9.2 05/23/2024     05/23/2024    K 3.9 05/23/2024    CO2 29 05/23/2024     05/23/2024    BUN 11 05/23/2024    CREATININE 0.69 05/23/2024    EGFR >90 05/23/2024     LFTs  Lab Results   Component Value Date    ALT 18 08/15/2024    AST 19 08/15/2024    ALKPHOS 60 05/23/2024    BILITOT 0.6 05/23/2024     FLP  Lab Results   Component Value Date    TRIG 76 08/15/2024    CHOL 143 08/15/2024    LDLF 188 (H) 05/15/2023    LDLCALC 86 08/15/2024    HDL 42.0 08/15/2024     Urine Microalbumin  No results found for: \"MICROALBCREA\"  Weight Management  Wt Readings from Last 3 Encounters:   05/22/25 108 kg (237 lb)   02/19/25 105 kg (231 lb)   12/24/24 106 kg (233 lb)      BMI Readings from Last 3 Encounters:   05/22/25 38.25 kg/m²   02/19/25 37.28 kg/m²   12/24/24 37.61 " kg/m²        Assessment/Plan     1. Obesity, Class II, BMI 35-39.9 (Primary)  Current weight 237 lbs (current BMI 38.25) - Obesity (class 2): BMI 35 to <40. Patient is actively implementing positive lifestyle modifications (Ex. Reduced-calorie diet, increase physical activity). Patient is planning to meet with a dietitian x 4 months. Patient would benefit from GLP1 therapy for weight loss and management of weight-related co-morbidities (hyperlipidemia and pain). Zepbound and Wegovy both require prior authorization. Will move forward with Zepbound. Provided education on medication mechanism of action, side effects, administration, and monitoring.     Plan:   START Zepbound 2.5 mg weekly x 4 weeks    Clinical Pharmacist follow-up: TBD. Patient is going on vacation 6/12-6/23 so she might hold off on starting a new medication until after this vacation.     Future Appointments   Date Time Provider Department [place]   8/12/2025  8:40 AM Tamara Valdez MD [id number]   2/25/2026 10:00 AM JARVIS Dillard-CNP [id number] [facility]       Continue all meds under the continuation of care with the referring provider and clinical pharmacy team.    Thank you,  Teressa Mcdaniel  Clinical Pharmacist  [phone]    Verbal consent to manage patient's drug therapy was obtained from the patient. They were informed they may decline to participate or withdraw from participation in pharmacy services at any time.         [1]   Allergies  Allergen Reactions    Vancomycin Angioedema    Levofloxacin Swelling    Morphine Itching    Blueberry Angioedema    Erythromycin Rash

## 2025-05-22 NOTE — PROGRESS NOTES
Subjective   Patient ID: Melissa Jorge is a 49 y.o. female who presents for Annual Exam, Hypothyroidism, Migraine, Anxiety, and Back Pain.    Past Medical, Surgical, and Family History reviewed and updated in chart.    Reviewed all medications by prescribing practitioner or clinical pharmacist (such as prescriptions, OTCs, herbal therapies and supplements) and documented in the medical record.    HPI  1. Physical Exam     - Colonoscopy done September 2023, with a 10-year clearance.     - Gets Pap and mammogram through GYN.     - Last mammogram in July 2024 was normal; referral for next mammogram due in July 2025 is in the system.     - Not up-to-date on Tdap or Shingrix vaccines; declines today due to prior vertigo from a COVID vaccination and general vaccine hesitancy.    2. Post Ablative Hypothyroidism     - Currently taking Synthroid 125 mcg daily, d.a.w.     - Willing to have blood work done today to monitor thyroid levels.     - Requests medication be sent to the Synthroid pharmacy.    3. Menopause, Weight Gain     - No menstrual cycle since July 2023.     - Using OTC supplements: magnesium glycinate, vitamin D, probiotic, and a stress vitamin.     - Reports difficulty losing weight and has gained approximately 50 lbs since menopause onset.     - Has tried a clean diet with minimal weight change; plans to start exercising more regularly.     - Interested in starting a GLP-1 agonist to aid with weight loss.    4. Migraines     - Taking Maxalt and Zofran as needed with good relief.     - Previously had monthly migraines during menopause; now experiencing them every other month.     - Requesting refills for Maxalt and Zofran.    5. Anxiety     - Using Atarax as needed, approximately 7 times per month.     - Reports infrequent use and overall feeling well.     - Requesting refills for Atarax.    6. Chronic Bilateral Low Back Pain     - Requesting refill of Flexeril to be used as needed.     - Uses infrequently,  primarily at night.     - A previous 90-day supply lasted one year.    Review of Systems  All pertinent positive symptoms are included in the history of present illness.    All other systems have been reviewed and are negative and noncontributory to this patient's current ailments.    Past Medical History:   Diagnosis Date    Allergic     Angioneurotic edema, initial encounter 2019    Angioedema of lips    Anxiety     Migraine, unspecified, not intractable, without status migrainosus 2022    Migraine headache    Personal history of other endocrine, nutritional and metabolic disease     History of thyroid disorder    Postprocedural hypothyroidism 2022    Hypothyroidism, postablative     Past Surgical History:   Procedure Laterality Date     SECTION, CLASSIC  2015     Section     SECTION, LOW TRANSVERSE      ENDOMETRIAL ABLATION      OOPHORECTOMY  2015    Oophorectomy - Unilateral (Removal Of One Ovary)    OTHER SURGICAL HISTORY  2015    Uterine Surgery    OTHER SURGICAL HISTORY  2020    Endometrial ablation    WISDOM TOOTH EXTRACTION       Social History     Tobacco Use    Smoking status: Never     Passive exposure: Never    Smokeless tobacco: Never   Substance Use Topics    Alcohol use: Not Currently     Comment: None    Drug use: Never     Family History   Problem Relation Name Age of Onset    Arthritis Mother Mother     Depression Mother Mother     Drug abuse Mother Mother     Early natural death Mother Mother     Hearing loss Mother Mother     Miscarriages / Stillbirths Mother Mother     Alcohol abuse Father Father     Heart disease Father Father     Cancer Maternal Grandmother Grandmother     Alcohol abuse Paternal Grandfather Grandfather      Immunization History   Administered Date(s) Administered    COVID-19, mRNA, LNP-S, PF, 30 mcg/0.3 mL dose 2021, 2021    Influenza, Unspecified 2009     Current Outpatient Medications  "  Medication Instructions    atorvastatin (LIPITOR) 20 mg, oral, Nightly    clobetasol (Temovate) 0.05 % ointment Topical, 2 times daily    cyclobenzaprine (FLEXERIL) 5 mg, oral, 3 times daily PRN    fluticasone (Flonase) 50 mcg/actuation nasal spray 1 spray, Each Nostril, Daily, Shake gently. Before first use, prime pump. After use, clean tip and replace cap.    hydrOXYzine HCL (Atarax) 50 mg tablet Take 1-2 tablets by mouth at night as needed for sleep.    levocetirizine (XYZAL) 5 mg, oral, Every evening    ondansetron (Zofran) 4 mg tablet TAKE 1-2 TABLETS EVERY 8 HOURS AS NEEDED FOR NAUSEA    rizatriptan MLT (MAXALT-MLT) 10 mg, oral, Once as needed    Synthroid 125 mcg tablet TAKE 1 TABLET PO DAILY    tirzepatide (weight loss) (ZEPBOUND) 2.5 mg, subcutaneous, Every 7 days     Allergies   Allergen Reactions    Vancomycin Angioedema    Levofloxacin Swelling    Morphine Itching    Blueberry Angioedema    Erythromycin Rash       Objective   Vitals:    05/22/25 0908   BP: 124/80   Pulse: 84   Weight: 108 kg (237 lb)   Height: 1.676 m (5' 6\")       Body mass index is 38.25 kg/m².    BP Readings from Last 3 Encounters:   05/22/25 124/80   02/19/25 141/86   12/24/24 (!) 158/91      Wt Readings from Last 3 Encounters:   05/22/25 108 kg (237 lb)   02/19/25 105 kg (231 lb)   12/24/24 106 kg (233 lb)        Office Visit on 05/22/2025   Component Date Value    POC Color, Urine 05/22/2025 Yellow     POC Appearance, Urine 05/22/2025 Clear     POC Glucose, Urine 05/22/2025 NEGATIVE     POC Bilirubin, Urine 05/22/2025 NEGATIVE     POC Ketones, Urine 05/22/2025 NEGATIVE     POC Specific Gravity, Ur* 05/22/2025 1.020     POC Blood, Urine 05/22/2025 NEGATIVE     POC PH, Urine 05/22/2025 6.5     POC Protein, Urine 05/22/2025 NEGATIVE     POC Urobilinogen, Urine 05/22/2025 0.2     Poc Nitrite, Urine 05/22/2025 NEGATIVE     POC Leukocytes, Urine 05/22/2025 NEGATIVE      Physical Exam  CONSTITUTIONAL - well nourished, well developed, " looks like stated age, in no acute distress, not ill-appearing, and not tired appearing  SKIN - normal skin color and pigmentation, normal skin turgor without rash, lesions, or nodules visualized  HEAD - no trauma, normocephalic  EYES - pupils are equal and reactive to light, extraocular muscles are intact, and normal external exam  ENT - TM's intact, no injection, no signs of infection, uvula midline, normal tongue movement and throat normal, no exudate  NECK - supple without rigidity, no neck mass was observed  CHEST - clear to auscultation, no wheezing, no crackles and no rales, good effort  CARDIAC - regular rate and regular rhythm, no skipped beats, no murmur  ABDOMEN - no organomegaly, soft, nontender, nondistended, normal bowel sounds  EXTREMITIES - no obvious or evident edema, no obvious or evident deformities  NEUROLOGICAL - normal gait, normal balance, normal motor, no ataxia; alert, oriented and no focal signs  PSYCHIATRIC - alert, pleasant and cordial, age-appropriate  IMMUNOLOGIC - no cervical lymphadenopathy    Assessment/Plan   Problem List Items Addressed This Visit       Anxiety    Stable, continue Atarax as needed. Refills sent to pharmacy.         Relevant Medications    hydrOXYzine HCL (Atarax) 50 mg tablet    Chronic seasonal allergic rhinitis    Stable with the use of Xyzal and Flonase. Refills sent to pharmacy.         Relevant Medications    fluticasone (Flonase) 50 mcg/actuation nasal spray    levocetirizine (Xyzal) 5 mg tablet    Hypothyroidism, postablative    We will check TSH with your blood work today  We will wait to refill your Synthroid until we have the results of your blood work         Migraine headache    Stable, continue Maxalt and Zofran as needed for migraines. Refills sent to pharmacy.         Relevant Medications    ondansetron (Zofran) 4 mg tablet    rizatriptan MLT (Maxalt-MLT) 10 mg disintegrating tablet    Mixed hyperlipidemia    Last lipid panel at goal, continue with  atorvastatin 20 mg daily. We will recheck lipid panel and follow up with any recommendations. Refills sent to pharmacy.         Relevant Orders    Comprehensive Metabolic Panel    Lipid Panel    Vitamin D deficiency    Relevant Orders    Vitamin D 25-Hydroxy,Total (for eval of Vitamin D levels)    Annual physical exam - Primary    Complete history and physical examination was performed  EKG reveals sinus rhythm. Similar in appearance to prior EKG.   Colonoscopy up to date  Pap and Mammogram done through Gyn, up to date  We will notify of test results once available and make treatment recommendations accordingly         Relevant Orders    CBC    Comprehensive Metabolic Panel    Hemoglobin A1C    Lipid Panel    TSH with reflex to Free T4 if abnormal    POCT UA (nonautomated) manually resulted (Completed)    ECG 12 Lead (Completed)    Chronic low back pain    Continue Flexeril as needed         Relevant Medications    cyclobenzaprine (Flexeril) 5 mg tablet    Class 2 obesity with body mass index (BMI) of 38.0 to 38.9 in adult    You have interest in starting a GLP-1 medication which ultimately helps with blood sugar control, weight management, and preservation of beta cell function.  In addition, this class of medication also has cardiovascular benefits    There may be an inherent risk of continual weight gain so you need to also incorporate some dietary changes into your daily regimen even if you choose to use the medication that is provided    If you stop the medication, your craving for food may return and you may gain more weight than you will have lost    We will have our clinical pharmacist to speak with you about the medication class and determine what is the most appropriate medication for you going forward along with helping with preauthorization    GLP-1 (glucagon-like peptide-1) medications, such as GLP-1 receptor agonists, offer several benefits for individuals who take them. Here are some of the potential  advantages:    1. Blood sugar control: GLP-1 medications help regulate blood sugar levels by stimulating insulin release from the pancreas and reducing the production of glucagon, a hormone that raises blood sugar. This can lead to improved glycemic control in individuals with hyperglycemia/prediabetes including those with type 2 diabetes.    2. Weight management: GLP-1 medications have been associated with weight loss or weight maintenance. They can promote a feeling of fullness, reduce appetite, and slow down gastric emptying, which may contribute to decreased calorie intake and potential weight loss.    3. Cardiovascular protection: Some GLP-1 medications have demonstrated cardiovascular benefits. They have been shown to reduce the risk of major adverse cardiovascular events (MACE) in individuals with established cardiovascular disease or high cardiovascular risk.    4. Blood pressure control: GLP-1 medications may have a positive impact on blood pressure. They can lead to modest reductions in systolic and diastolic blood pressure, which is beneficial for individuals with elevated blood pressure, hypertension, or cardiovascular disease.    5. Potential renal benefits: There is evidence suggesting that GLP-1 medications might have renal protective effects. They may reduce albuminuria (presence of protein in urine) and slow the decline of kidney function in individuals, in particular those with diabetic kidney disease.    6. Lower hypoglycemia risk: Compared to some other diabetes medications, GLP-1 medications have a lower risk of causing hypoglycemia (low blood sugar). This can be advantageous, particularly for individuals who are at higher risk of experiencing hypoglycemic episodes.    7. Potential improvement in beta-cell function: GLP-1 medications have been associated with preserving or improving pancreatic beta-cell function, which is responsible for insulin production. This can be beneficial for individuals  with prediabetes and type 2 diabetes.          Other Visit Diagnoses         Diabetes mellitus screening        Relevant Orders    Hemoglobin A1C

## 2025-05-22 NOTE — ASSESSMENT & PLAN NOTE
Last lipid panel at goal, continue with atorvastatin 20 mg daily. We will recheck lipid panel and follow up with any recommendations. Refills sent to pharmacy.

## 2025-05-22 NOTE — ASSESSMENT & PLAN NOTE
You have interest in starting a GLP-1 medication which ultimately helps with blood sugar control, weight management, and preservation of beta cell function.  In addition, this class of medication also has cardiovascular benefits    There may be an inherent risk of continual weight gain so you need to also incorporate some dietary changes into your daily regimen even if you choose to use the medication that is provided    If you stop the medication, your craving for food may return and you may gain more weight than you will have lost    We will have our clinical pharmacist to speak with you about the medication class and determine what is the most appropriate medication for you going forward along with helping with preauthorization    GLP-1 (glucagon-like peptide-1) medications, such as GLP-1 receptor agonists, offer several benefits for individuals who take them. Here are some of the potential advantages:    1. Blood sugar control: GLP-1 medications help regulate blood sugar levels by stimulating insulin release from the pancreas and reducing the production of glucagon, a hormone that raises blood sugar. This can lead to improved glycemic control in individuals with hyperglycemia/prediabetes including those with type 2 diabetes.    2. Weight management: GLP-1 medications have been associated with weight loss or weight maintenance. They can promote a feeling of fullness, reduce appetite, and slow down gastric emptying, which may contribute to decreased calorie intake and potential weight loss.    3. Cardiovascular protection: Some GLP-1 medications have demonstrated cardiovascular benefits. They have been shown to reduce the risk of major adverse cardiovascular events (MACE) in individuals with established cardiovascular disease or high cardiovascular risk.    4. Blood pressure control: GLP-1 medications may have a positive impact on blood pressure. They can lead to modest reductions in systolic and diastolic blood  pressure, which is beneficial for individuals with elevated blood pressure, hypertension, or cardiovascular disease.    5. Potential renal benefits: There is evidence suggesting that GLP-1 medications might have renal protective effects. They may reduce albuminuria (presence of protein in urine) and slow the decline of kidney function in individuals, in particular those with diabetic kidney disease.    6. Lower hypoglycemia risk: Compared to some other diabetes medications, GLP-1 medications have a lower risk of causing hypoglycemia (low blood sugar). This can be advantageous, particularly for individuals who are at higher risk of experiencing hypoglycemic episodes.    7. Potential improvement in beta-cell function: GLP-1 medications have been associated with preserving or improving pancreatic beta-cell function, which is responsible for insulin production. This can be beneficial for individuals with prediabetes and type 2 diabetes.

## 2025-05-22 NOTE — ASSESSMENT & PLAN NOTE
Complete history and physical examination was performed  EKG reveals sinus rhythm. Similar in appearance to prior EKG.   Colonoscopy up to date  Pap and Mammogram done through Gyn, up to date  We will notify of test results once available and make treatment recommendations accordingly

## 2025-05-23 DIAGNOSIS — E78.2 MIXED HYPERLIPIDEMIA: ICD-10-CM

## 2025-05-23 DIAGNOSIS — E89.0 HYPOTHYROIDISM, POSTABLATIVE: ICD-10-CM

## 2025-05-23 LAB
25(OH)D3+25(OH)D2 SERPL-MCNC: 47 NG/ML (ref 30–100)
ALBUMIN SERPL-MCNC: 4.6 G/DL (ref 3.6–5.1)
ALP SERPL-CCNC: 82 U/L (ref 37–153)
ALT SERPL-CCNC: 19 U/L (ref 6–29)
ANION GAP SERPL CALCULATED.4IONS-SCNC: 8 MMOL/L (CALC) (ref 7–17)
AST SERPL-CCNC: 16 U/L (ref 10–35)
BILIRUB SERPL-MCNC: 0.7 MG/DL (ref 0.2–1.2)
BUN SERPL-MCNC: 11 MG/DL (ref 7–25)
CALCIUM SERPL-MCNC: 9.3 MG/DL (ref 8.6–10.4)
CHLORIDE SERPL-SCNC: 105 MMOL/L (ref 98–110)
CHOLEST SERPL-MCNC: 148 MG/DL
CHOLEST/HDLC SERPL: 3.3 (CALC)
CO2 SERPL-SCNC: 27 MMOL/L (ref 20–32)
CREAT SERPL-MCNC: 0.67 MG/DL (ref 0.5–1.03)
EGFRCR SERPLBLD CKD-EPI 2021: 106 ML/MIN/1.73M2
ERYTHROCYTE [DISTWIDTH] IN BLOOD BY AUTOMATED COUNT: 12.9 % (ref 11–15)
EST. AVERAGE GLUCOSE BLD GHB EST-MCNC: 114 MG/DL
EST. AVERAGE GLUCOSE BLD GHB EST-SCNC: 6.3 MMOL/L
GLUCOSE SERPL-MCNC: 99 MG/DL (ref 65–99)
HBA1C MFR BLD: 5.6 %
HCT VFR BLD AUTO: 45.2 % (ref 35–45)
HDLC SERPL-MCNC: 45 MG/DL
HGB BLD-MCNC: 14.9 G/DL (ref 11.7–15.5)
LDLC SERPL CALC-MCNC: 84 MG/DL (CALC)
MCH RBC QN AUTO: 29.3 PG (ref 27–33)
MCHC RBC AUTO-ENTMCNC: 33 G/DL (ref 32–36)
MCV RBC AUTO: 88.8 FL (ref 80–100)
NONHDLC SERPL-MCNC: 103 MG/DL (CALC)
PLATELET # BLD AUTO: 314 THOUSAND/UL (ref 140–400)
PMV BLD REES-ECKER: 10.9 FL (ref 7.5–12.5)
POTASSIUM SERPL-SCNC: 4.5 MMOL/L (ref 3.5–5.3)
PROT SERPL-MCNC: 6.4 G/DL (ref 6.1–8.1)
RBC # BLD AUTO: 5.09 MILLION/UL (ref 3.8–5.1)
SODIUM SERPL-SCNC: 140 MMOL/L (ref 135–146)
T4 FREE SERPL-MCNC: 1.5 NG/DL (ref 0.8–1.8)
TRIGL SERPL-MCNC: 93 MG/DL
TSH SERPL-ACNC: 0.19 MIU/L
WBC # BLD AUTO: 6.4 THOUSAND/UL (ref 3.8–10.8)

## 2025-05-23 RX ORDER — LEVOTHYROXINE SODIUM 125 UG/1
125 TABLET ORAL DAILY
Qty: 30 TABLET | Refills: 0 | Status: SHIPPED | OUTPATIENT
Start: 2025-05-23 | End: 2025-06-22

## 2025-05-23 RX ORDER — LEVOTHYROXINE SODIUM 125 UG/1
TABLET ORAL
Qty: 90 TABLET | Refills: 2 | Status: CANCELLED | OUTPATIENT
Start: 2025-05-23

## 2025-05-23 RX ORDER — ATORVASTATIN CALCIUM 20 MG/1
20 TABLET, FILM COATED ORAL NIGHTLY
Qty: 90 TABLET | Refills: 1 | Status: SHIPPED | OUTPATIENT
Start: 2025-05-23 | End: 2025-11-19

## 2025-05-23 RX ORDER — LEVOTHYROXINE SODIUM 125 UG/1
125 TABLET ORAL DAILY
Qty: 90 TABLET | Refills: 3 | Status: SHIPPED | OUTPATIENT
Start: 2025-05-23 | End: 2026-05-23

## 2025-05-23 NOTE — RESULT ENCOUNTER NOTE
TSH 0.19 with a goal of 0.1-2.00, so I will send over 30-day supply to the local pharmacy and then I will send the next year out to the mail out pharmacy as we have done in the past for you  Cholesterol, sugar, kidney, liver, electrolytes, complete blood cell count and vitamin D all stable

## 2025-05-27 DIAGNOSIS — Z12.31 BREAST CANCER SCREENING BY MAMMOGRAM: Primary | ICD-10-CM

## 2025-05-28 ENCOUNTER — OFFICE VISIT (OUTPATIENT)
Dept: OBSTETRICS AND GYNECOLOGY | Facility: CLINIC | Age: 50
End: 2025-05-28
Payer: COMMERCIAL

## 2025-05-28 VITALS — WEIGHT: 237 LBS | BODY MASS INDEX: 38.25 KG/M2

## 2025-05-28 DIAGNOSIS — N30.90 CYSTITIS: Primary | ICD-10-CM

## 2025-05-28 DIAGNOSIS — R30.0 DYSURIA: ICD-10-CM

## 2025-05-28 PROCEDURE — 1036F TOBACCO NON-USER: CPT | Performed by: OBSTETRICS & GYNECOLOGY

## 2025-05-28 PROCEDURE — 99214 OFFICE O/P EST MOD 30 MIN: CPT | Performed by: OBSTETRICS & GYNECOLOGY

## 2025-05-28 RX ORDER — SULFAMETHOXAZOLE AND TRIMETHOPRIM 800; 160 MG/1; MG/1
1 TABLET ORAL 2 TIMES DAILY
Qty: 10 TABLET | Refills: 0 | Status: SHIPPED | OUTPATIENT
Start: 2025-05-28 | End: 2025-06-02

## 2025-05-28 ASSESSMENT — ENCOUNTER SYMPTOMS
AGITATION: 0
COUGH: 0
ACTIVITY CHANGE: 0
CONSTIPATION: 0
HEMATURIA: 0
APNEA: 0
DIARRHEA: 0
DIZZINESS: 0
JOINT SWELLING: 0

## 2025-05-28 NOTE — ASSESSMENT & PLAN NOTE
Will send urine for culture. Will change antibiotic if needed based on results.  Hydration is encouraged.   Bactrim DS is prescribed for presumed UTI.

## 2025-05-28 NOTE — PROGRESS NOTES
Subjective   Patient ID: Melissa Jorge is a 49 y.o. female who presents for Urinary Symptom.  She presents for gyn visit.   She notes urinary urgency and burning with urination. She also notes pelvic pain and pressure. She did start Azo for discomfort.  She tends to have 1-2 bladder infections each year. They are often triggered by sugar intake and she was at a party over the weekend.  She is taking tylenol for the discomfort.          Review of Systems   Constitutional:  Negative for activity change.   HENT:  Negative for congestion.    Respiratory:  Negative for apnea and cough.    Cardiovascular:  Negative for chest pain.   Gastrointestinal:  Negative for constipation and diarrhea.   Genitourinary:  Positive for urgency. Negative for hematuria and vaginal pain.   Musculoskeletal:  Negative for joint swelling.   Neurological:  Negative for dizziness.   Psychiatric/Behavioral:  Negative for agitation.        Medical History[1]   Surgical History[2]   RX Allergies[3]   Medications Ordered Prior to Encounter[4]     Objective   Physical Exam  Constitutional:       Appearance: Normal appearance.   Abdominal:      Palpations: Abdomen is soft.      Tenderness: There is abdominal tenderness.      Comments: Mild suprapubic tenderness.   Neurological:      Mental Status: She is alert and oriented to person, place, and time.   Psychiatric:         Attention and Perception: Attention normal.         Mood and Affect: Mood and affect normal.         Speech: Speech normal.         Behavior: Behavior normal. Behavior is cooperative.           Problem List Items Addressed This Visit          Medium    Cystitis - Primary    Overview   Dysuria and urinary frequency. Urinalysis is positive for nitrates.         Current Assessment & Plan   Will send urine for culture. Will change antibiotic if needed based on results.  Hydration is encouraged.   Bactrim DS is prescribed for presumed UTI.          Other Visit Diagnoses          Dysuria        Relevant Orders    Urine culture                       [1]   Past Medical History:  Diagnosis Date    Allergic     Angioneurotic edema, initial encounter 2019    Angioedema of lips    Anxiety     Migraine, unspecified, not intractable, without status migrainosus 2022    Migraine headache    Personal history of other endocrine, nutritional and metabolic disease     History of thyroid disorder    Postprocedural hypothyroidism 2022    Hypothyroidism, postablative   [2]   Past Surgical History:  Procedure Laterality Date     SECTION, CLASSIC  2015     Section     SECTION, LOW TRANSVERSE      ENDOMETRIAL ABLATION      OOPHORECTOMY  2015    Oophorectomy - Unilateral (Removal Of One Ovary)    OTHER SURGICAL HISTORY  2015    Uterine Surgery    OTHER SURGICAL HISTORY  2020    Endometrial ablation    WISDOM TOOTH EXTRACTION     [3]   Allergies  Allergen Reactions    Vancomycin Angioedema    Levofloxacin Swelling    Morphine Itching    Blueberry Angioedema    Erythromycin Rash   [4]   Current Outpatient Medications on File Prior to Visit   Medication Sig Dispense Refill    atorvastatin (Lipitor) 20 mg tablet Take 1 tablet (20 mg) by mouth once daily at bedtime. 90 tablet 1    clobetasol (Temovate) 0.05 % ointment Apply topically 2 times a day. 30 g 1    cyclobenzaprine (Flexeril) 5 mg tablet Take 1 tablet (5 mg) by mouth 3 times a day as needed for muscle spasms. 90 tablet 0    fluticasone (Flonase) 50 mcg/actuation nasal spray Administer 1 spray into each nostril once daily. Shake gently. Before first use, prime pump. After use, clean tip and replace cap. 48 g 3    hydrOXYzine HCL (Atarax) 50 mg tablet Take 1-2 tablets by mouth at night as needed for sleep. 180 tablet 1    levocetirizine (Xyzal) 5 mg tablet Take 1 tablet (5 mg) by mouth once daily in the evening. 90 tablet 1    ondansetron (Zofran) 4 mg tablet TAKE 1-2 TABLETS EVERY 8 HOURS AS  NEEDED FOR NAUSEA 20 tablet 3    rizatriptan MLT (Maxalt-MLT) 10 mg disintegrating tablet Dissolve 1 tablet (10 mg) in the mouth 1 time if needed for migraine. 27 tablet 3    Synthroid 125 mcg tablet Take 1 tablet (125 mcg) by mouth early in the morning.. TAKE 1 TABLET PO DAILY 30 tablet 0    Synthroid 125 mcg tablet Take 1 tablet (125 mcg) by mouth early in the morning.. TAKE 1 TABLET PO DAILY 90 tablet 3    tirzepatide, weight loss, (Zepbound) 2.5 mg/0.5 mL injection Inject 2.5 mg under the skin every 7 days. 2 mL 0    [DISCONTINUED] atorvastatin (Lipitor) 20 mg tablet Take 1 tablet (20 mg) by mouth once daily at bedtime. 90 tablet 3    [DISCONTINUED] fluticasone (Flonase) 50 mcg/actuation nasal spray Administer 1 spray into each nostril once daily. Shake gently. Before first use, prime pump. After use, clean tip and replace cap. 48 g 3    [DISCONTINUED] hydrOXYzine HCL (Atarax) 50 mg tablet Take 1-2 tablets by mouth at night as needed for sleep. 180 tablet 3    [DISCONTINUED] levocetirizine (Xyzal) 5 mg tablet Take 1 tablet (5 mg) by mouth once daily in the evening. 90 tablet 3    [DISCONTINUED] ondansetron (Zofran) 4 mg tablet TAKE 1-2 TABLETS EVERY 8 HOURS AS NEEDED FOR NAUSEA 20 tablet 3    [DISCONTINUED] rizatriptan MLT (Maxalt-MLT) 10 mg disintegrating tablet Take 1 tablet (10 mg) by mouth 1 time if needed for migraine. 27 tablet 3    [DISCONTINUED] Synthroid 125 mcg tablet TAKE 1 TABLET PO DAILY 90 tablet 2     No current facility-administered medications on file prior to visit.

## 2025-05-30 LAB — BACTERIA UR CULT: NORMAL

## 2025-06-02 RX ORDER — NITROFURANTOIN 25; 75 MG/1; MG/1
100 CAPSULE ORAL 2 TIMES DAILY
Qty: 14 CAPSULE | Refills: 0 | Status: SHIPPED | OUTPATIENT
Start: 2025-06-02 | End: 2025-06-09

## 2025-06-22 ENCOUNTER — PATIENT MESSAGE (OUTPATIENT)
Dept: PRIMARY CARE | Facility: CLINIC | Age: 50
End: 2025-06-22
Payer: COMMERCIAL

## 2025-06-22 DIAGNOSIS — J30.9 ALLERGIC RHINITIS, UNSPECIFIED SEASONALITY, UNSPECIFIED TRIGGER: Primary | ICD-10-CM

## 2025-06-23 RX ORDER — CETIRIZINE HYDROCHLORIDE 10 MG/1
10 TABLET ORAL DAILY
Qty: 90 TABLET | Refills: 1 | Status: SHIPPED | OUTPATIENT
Start: 2025-06-23 | End: 2025-12-20

## 2025-06-24 DIAGNOSIS — N95.0 POSTMENOPAUSAL BLEEDING: Primary | ICD-10-CM

## 2025-06-26 ENCOUNTER — HOSPITAL ENCOUNTER (OUTPATIENT)
Dept: RADIOLOGY | Facility: CLINIC | Age: 50
Discharge: HOME | End: 2025-06-26
Payer: COMMERCIAL

## 2025-06-26 DIAGNOSIS — N95.0 POSTMENOPAUSAL BLEEDING: ICD-10-CM

## 2025-06-26 PROCEDURE — 76856 US EXAM PELVIC COMPLETE: CPT

## 2025-06-27 LAB
ESTRADIOL SERPL-MCNC: 28 PG/ML
FSH SERPL-ACNC: 85.3 MIU/ML

## 2025-06-30 RX ORDER — DIAZEPAM 5 MG/1
5 TABLET ORAL ONCE
Qty: 2 TABLET | Refills: 0 | Status: SHIPPED | OUTPATIENT
Start: 2025-06-30 | End: 2025-06-30

## 2025-06-30 NOTE — PROGRESS NOTES
Subjective   Patient ID: Melissa Jorge is a 49 y.o. female who presents for Follow Up US  (June 12,13 bleeding ).  She presents for gyn evaluation of postmenopausal bleeding.   Pap and HPV returned negative on 7/17/2024. She has had an endometrial ablation in the past.    She reached out to report two days of spotting in mid June 2025 after no menses in 2 years. This was noted when she wiped. She had slight cramping at that time also. This was then light and intermittent for two days.  FSH and estradiol confirm menopause.  6/26/2025 ultrasound shows normal sized uterus with 4 mm endometrium, surgically absent right ovary and normal appearing left ovary.  She admits to significant stress recently. She did have TSH recently return low but synthroid dose was not changed.    We reviewed option of endometrial biopsy and pap today. She declines this for now, but will return with any further bleeding. She has annual exam scheduled for next month, and she would like to plan pap then. She plans to follow with her new PCP for thyroid management.           Review of Systems   Constitutional:  Negative for activity change.   HENT:  Negative for congestion.    Respiratory:  Negative for apnea and cough.    Cardiovascular:  Negative for chest pain.   Gastrointestinal:  Negative for constipation and diarrhea.   Genitourinary:  Negative for hematuria and vaginal pain.   Musculoskeletal:  Negative for joint swelling.   Neurological:  Negative for dizziness.   Psychiatric/Behavioral:  Negative for agitation.        Medical History[1]   Surgical History[2]   RX Allergies[3]   Medications Ordered Prior to Encounter[4]     Objective   Physical Exam  Constitutional:       Appearance: Normal appearance.   Pulmonary:      Effort: Pulmonary effort is normal.   Neurological:      Mental Status: She is alert and oriented to person, place, and time.   Psychiatric:         Attention and Perception: Attention normal.         Mood and Affect:  Mood and affect normal.         Speech: Speech normal.         Behavior: Behavior normal. Behavior is cooperative.           Problem List Items Addressed This Visit          Medium    Postmenopausal bleeding - Primary    Overview   Two days of spotting 2025 after no menses in 2 years.   FSH and estradiol confirm menopause.  2025 ultrasound shows normal sized uterus with 4 mm endometrium, surgically absent right ovary and normal appearing left ovary.         Current Assessment & Plan   Endometrial biopsy is not necessarily indicated with thin endometrium on imaging. She declines this for now but will return for this if any further bleeding occurs.   Follow up for annual exam next month. Will plan pap test that date.                        [1]   Past Medical History:  Diagnosis Date    Allergic     Angioneurotic edema, initial encounter 2019    Angioedema of lips    Anxiety     Disease of thyroid gland Age 11 - Graves Disease - tx VIDAL    Migraine, unspecified, not intractable, without status migrainosus 2022    Migraine headache    Personal history of other endocrine, nutritional and metabolic disease     History of thyroid disorder    Postprocedural hypothyroidism 2022    Hypothyroidism, postablative   [2]   Past Surgical History:  Procedure Laterality Date     SECTION, CLASSIC  2015     Section     SECTION, LOW TRANSVERSE   and     ENDOMETRIAL ABLATION  2013    OOPHORECTOMY  2015    Oophorectomy - Unilateral (Removal Of One Ovary)    OTHER SURGICAL HISTORY  2015    Uterine Surgery    OTHER SURGICAL HISTORY  2020    Endometrial ablation    WISDOM TOOTH EXTRACTION     [3]   Allergies  Allergen Reactions    Vancomycin Angioedema    Levofloxacin Swelling    Morphine Itching    Blueberry Angioedema    Erythromycin Rash   [4]   Current Outpatient Medications on File Prior to Visit   Medication Sig Dispense Refill    atorvastatin (Lipitor)  20 mg tablet Take 1 tablet (20 mg) by mouth once daily at bedtime. 90 tablet 1    cetirizine (ZyrTEC) 10 mg tablet Take 1 tablet (10 mg) by mouth once daily. 90 tablet 1    cyclobenzaprine (Flexeril) 5 mg tablet Take 1 tablet (5 mg) by mouth 3 times a day as needed for muscle spasms. 90 tablet 0    fluticasone (Flonase) 50 mcg/actuation nasal spray Administer 1 spray into each nostril once daily. Shake gently. Before first use, prime pump. After use, clean tip and replace cap. 48 g 3    hydrOXYzine HCL (Atarax) 50 mg tablet Take 1-2 tablets by mouth at night as needed for sleep. 180 tablet 1    levocetirizine (Xyzal) 5 mg tablet Take 1 tablet (5 mg) by mouth once daily in the evening. 90 tablet 1    ondansetron (Zofran) 4 mg tablet TAKE 1-2 TABLETS EVERY 8 HOURS AS NEEDED FOR NAUSEA 20 tablet 3    rizatriptan MLT (Maxalt-MLT) 10 mg disintegrating tablet Dissolve 1 tablet (10 mg) in the mouth 1 time if needed for migraine. 27 tablet 3    Synthroid 125 mcg tablet Take 1 tablet (125 mcg) by mouth early in the morning.. TAKE 1 TABLET PO DAILY 30 tablet 0    clobetasol (Temovate) 0.05 % ointment Apply topically 2 times a day. 30 g 1    Synthroid 125 mcg tablet Take 1 tablet (125 mcg) by mouth early in the morning.. TAKE 1 TABLET PO DAILY 90 tablet 3    tirzepatide, weight loss, (Zepbound) 2.5 mg/0.5 mL injection Inject 2.5 mg under the skin every 7 days. (Patient not taking: Reported on 7/3/2025) 2 mL 0     No current facility-administered medications on file prior to visit.

## 2025-07-03 ENCOUNTER — APPOINTMENT (OUTPATIENT)
Dept: OBSTETRICS AND GYNECOLOGY | Facility: CLINIC | Age: 50
End: 2025-07-03
Payer: COMMERCIAL

## 2025-07-03 VITALS — SYSTOLIC BLOOD PRESSURE: 130 MMHG | BODY MASS INDEX: 37.77 KG/M2 | DIASTOLIC BLOOD PRESSURE: 82 MMHG | WEIGHT: 234 LBS

## 2025-07-03 DIAGNOSIS — N95.0 POSTMENOPAUSAL BLEEDING: Primary | ICD-10-CM

## 2025-07-03 PROCEDURE — 99213 OFFICE O/P EST LOW 20 MIN: CPT | Performed by: OBSTETRICS & GYNECOLOGY

## 2025-07-03 PROCEDURE — 1036F TOBACCO NON-USER: CPT | Performed by: OBSTETRICS & GYNECOLOGY

## 2025-07-03 ASSESSMENT — ENCOUNTER SYMPTOMS
HEMATURIA: 0
APNEA: 0
CONSTIPATION: 0
DIARRHEA: 0
AGITATION: 0
JOINT SWELLING: 0
COUGH: 0
ACTIVITY CHANGE: 0
DIZZINESS: 0

## 2025-07-03 NOTE — ASSESSMENT & PLAN NOTE
Endometrial biopsy is not necessarily indicated with thin endometrium on imaging. She declines this for now but will return for this if any further bleeding occurs.   Follow up for annual exam next month. Will plan pap test that date.

## 2025-07-21 ENCOUNTER — APPOINTMENT (OUTPATIENT)
Dept: RADIOLOGY | Facility: CLINIC | Age: 50
End: 2025-07-21
Payer: COMMERCIAL

## 2025-08-12 ENCOUNTER — APPOINTMENT (OUTPATIENT)
Dept: OBSTETRICS AND GYNECOLOGY | Facility: CLINIC | Age: 50
End: 2025-08-12
Payer: COMMERCIAL

## 2025-08-12 VITALS
BODY MASS INDEX: 36.96 KG/M2 | HEIGHT: 66 IN | DIASTOLIC BLOOD PRESSURE: 86 MMHG | WEIGHT: 230 LBS | SYSTOLIC BLOOD PRESSURE: 148 MMHG

## 2025-08-12 DIAGNOSIS — Z01.419 WELL WOMAN EXAM WITH ROUTINE GYNECOLOGICAL EXAM: Primary | ICD-10-CM

## 2025-08-12 DIAGNOSIS — F41.9 ANXIETY: ICD-10-CM

## 2025-08-12 PROCEDURE — 3008F BODY MASS INDEX DOCD: CPT | Performed by: OBSTETRICS & GYNECOLOGY

## 2025-08-12 PROCEDURE — 99396 PREV VISIT EST AGE 40-64: CPT | Performed by: OBSTETRICS & GYNECOLOGY

## 2025-08-12 PROCEDURE — 87626 HPV SEP HI-RSK TYP&POOL RSLT: CPT

## 2025-08-12 PROCEDURE — 1036F TOBACCO NON-USER: CPT | Performed by: OBSTETRICS & GYNECOLOGY

## 2025-08-12 RX ORDER — NYSTATIN AND TRIAMCINOLONE ACETONIDE 100000; 1 [USP'U]/G; MG/G
CREAM TOPICAL
COMMUNITY
Start: 2025-08-06

## 2025-08-12 RX ORDER — CITALOPRAM 20 MG/1
20 TABLET ORAL DAILY
Qty: 90 TABLET | Refills: 3 | Status: SHIPPED | OUTPATIENT
Start: 2025-08-12 | End: 2026-08-12

## 2025-08-12 ASSESSMENT — PATIENT HEALTH QUESTIONNAIRE - PHQ9
1. LITTLE INTEREST OR PLEASURE IN DOING THINGS: NOT AT ALL
2. FEELING DOWN, DEPRESSED OR HOPELESS: NOT AT ALL
SUM OF ALL RESPONSES TO PHQ9 QUESTIONS 1 AND 2: 0

## 2025-08-12 ASSESSMENT — ENCOUNTER SYMPTOMS
APNEA: 0
DIZZINESS: 0
ACTIVITY CHANGE: 0
CONSTIPATION: 0
COUGH: 0
DIARRHEA: 0
JOINT SWELLING: 0
HEMATURIA: 0
AGITATION: 0

## 2025-08-12 ASSESSMENT — COLUMBIA-SUICIDE SEVERITY RATING SCALE - C-SSRS
1. IN THE PAST MONTH, HAVE YOU WISHED YOU WERE DEAD OR WISHED YOU COULD GO TO SLEEP AND NOT WAKE UP?: NO
6. HAVE YOU EVER DONE ANYTHING, STARTED TO DO ANYTHING, OR PREPARED TO DO ANYTHING TO END YOUR LIFE?: NO
2. HAVE YOU ACTUALLY HAD ANY THOUGHTS OF KILLING YOURSELF?: NO

## 2025-08-15 ENCOUNTER — APPOINTMENT (OUTPATIENT)
Dept: RADIOLOGY | Facility: CLINIC | Age: 50
End: 2025-08-15
Payer: COMMERCIAL

## 2025-08-25 ENCOUNTER — APPOINTMENT (OUTPATIENT)
Dept: RADIOLOGY | Facility: CLINIC | Age: 50
End: 2025-08-25
Payer: COMMERCIAL

## 2025-08-25 VITALS — HEIGHT: 66 IN | WEIGHT: 229.28 LBS | BODY MASS INDEX: 36.85 KG/M2

## 2025-08-25 DIAGNOSIS — Z12.31 BREAST CANCER SCREENING BY MAMMOGRAM: ICD-10-CM

## 2025-08-25 PROCEDURE — 77067 SCR MAMMO BI INCL CAD: CPT | Performed by: RADIOLOGY

## 2025-08-25 PROCEDURE — 77063 BREAST TOMOSYNTHESIS BI: CPT | Performed by: RADIOLOGY

## 2025-08-25 PROCEDURE — 77067 SCR MAMMO BI INCL CAD: CPT

## 2026-09-08 ENCOUNTER — APPOINTMENT (OUTPATIENT)
Dept: OBSTETRICS AND GYNECOLOGY | Facility: CLINIC | Age: 51
End: 2026-09-08
Payer: COMMERCIAL